# Patient Record
Sex: FEMALE | Race: WHITE | NOT HISPANIC OR LATINO | URBAN - METROPOLITAN AREA
[De-identification: names, ages, dates, MRNs, and addresses within clinical notes are randomized per-mention and may not be internally consistent; named-entity substitution may affect disease eponyms.]

---

## 2018-06-08 ENCOUNTER — EMERGENCY (EMERGENCY)
Facility: HOSPITAL | Age: 73
LOS: 1 days | Discharge: HOME | End: 2018-06-08
Attending: EMERGENCY MEDICINE | Admitting: EMERGENCY MEDICINE
Payer: MEDICARE

## 2018-06-08 VITALS
RESPIRATION RATE: 16 BRPM | DIASTOLIC BLOOD PRESSURE: 85 MMHG | SYSTOLIC BLOOD PRESSURE: 140 MMHG | TEMPERATURE: 98 F | HEART RATE: 85 BPM | OXYGEN SATURATION: 97 %

## 2018-06-08 VITALS
OXYGEN SATURATION: 96 % | HEIGHT: 64 IN | DIASTOLIC BLOOD PRESSURE: 79 MMHG | TEMPERATURE: 98 F | SYSTOLIC BLOOD PRESSURE: 137 MMHG | HEART RATE: 97 BPM | RESPIRATION RATE: 16 BRPM | WEIGHT: 115.08 LBS

## 2018-06-08 PROCEDURE — 73130 X-RAY EXAM OF HAND: CPT | Mod: 26,LT

## 2018-06-08 PROCEDURE — 73130 X-RAY EXAM OF HAND: CPT

## 2018-06-08 PROCEDURE — 99283 EMERGENCY DEPT VISIT LOW MDM: CPT | Mod: 25

## 2018-06-08 PROCEDURE — 29125 APPL SHORT ARM SPLINT STATIC: CPT

## 2018-06-08 PROCEDURE — 29125 APPL SHORT ARM SPLINT STATIC: CPT | Mod: LT

## 2018-06-08 RX ORDER — ACETAMINOPHEN 500 MG
650 TABLET ORAL ONCE
Qty: 0 | Refills: 0 | Status: COMPLETED | OUTPATIENT
Start: 2018-06-08 | End: 2018-06-08

## 2018-06-08 RX ORDER — CLONAZEPAM 1 MG
1 TABLET ORAL
Qty: 0 | Refills: 0 | COMMUNITY

## 2018-06-08 RX ORDER — AZTREONAM 2 G
1 VIAL (EA) INJECTION
Qty: 20 | Refills: 0 | OUTPATIENT
Start: 2018-06-08 | End: 2018-06-17

## 2018-06-08 RX ORDER — PANTOPRAZOLE SODIUM 20 MG/1
1 TABLET, DELAYED RELEASE ORAL
Qty: 0 | Refills: 0 | COMMUNITY

## 2018-06-08 RX ADMIN — Medication 650 MILLIGRAM(S): at 23:01

## 2018-06-08 RX ADMIN — Medication 1 TABLET(S): at 23:00

## 2018-06-08 NOTE — ED ADULT NURSE NOTE - OBJECTIVE STATEMENT
states gardening w/ gloves on yesterday and pulled forcefully at roots. today redness, swelling to lateral left hand. Pain on movement, palpation.

## 2018-06-08 NOTE — ED PROVIDER NOTE - UPPER EXTREMITY EXAM, LEFT
TENDERNESS/+ttp with erythema, mild swelling and warmth noted to lateral aspect of L hand along palmar and dorsal 3-5th MCPJ and proximal aspect of dorsal L 5th finger, fingers warm&mobile, cap refill<2sec, pulses and sensation intact, NVI, no axillary lymphadenopathy noted L, skin intact, no streaking/discharge/lesions noted, NVI/SWELLING +ttp with erythema, mild swelling and warmth noted to lateral aspect of L hand along palmar hypothenar eminence and dorsal 3-5th MCPJ and proximal aspect of dorsal L 5th finger, fingers warm&mobile, cap refill<2sec, pulses and sensation intact, NVI, no axillary lymphadenopathy noted L, skin intact, no streaking/discharge/lesions noted, NVI/SWELLING/TENDERNESS

## 2018-06-08 NOTE — ED PROVIDER NOTE - ATTENDING CONTRIBUTION TO CARE
Pt seen and examined and d/w PA.  agree with a and p.  pt is a 71 yo female gardening yesterday with gloves and snapping twigs, pt states with discomfort and redness over left side of hand, today increased, no f/c or numbness.  on exam hand with swelling and redness over hypothenar aspect of hand, mild ttp, no warmth. from at  wrist and fingers.  xray neg, labs wnl. ulnar splint, abx fu hand

## 2018-06-08 NOTE — ED ADULT TRIAGE NOTE - CHIEF COMPLAINT QUOTE
"I have swelling, redness and pain to my left hand since last night. I was working in the garden yesterday."

## 2018-06-08 NOTE — ED PROVIDER NOTE - PROGRESS NOTE DETAILS
Pt examined by ED attending, Dr. Mohr who agreed with disposition and plan. Spoke to radiologist regarding xray results, advised no acute fractures noted. +soft tissue swelling with calcifications noted. Will place L hand in ulnar gutter splint, bactrim, f/u hand. Spoke to hand specialist, Dr. Breen, agreed with disposition and plan, advised to f/u in office on Monday.

## 2018-06-08 NOTE — ED ADULT NURSE NOTE - CHPI ED SYMPTOMS NEG
no decreased eating/drinking/no tingling/no weakness/no dizziness/no nausea/no numbness/no vomiting/no chills/no fever

## 2018-06-08 NOTE — ED PROVIDER NOTE - MEDICAL DECISION MAKING DETAILS
71 YO F HERE WITH L HAND PAIN/SWELLING/REDNESS S/P GARDENING YESTERDAY, SKIN INTACT, NVI, R/O FX, CELLULITIS- WILL GET XRAY, ABX, RE-ASSESS

## 2018-06-08 NOTE — ED ADULT NURSE REASSESSMENT NOTE - NS ED NURSE REASSESS COMMENT FT1
States gardening yesterday w/ gloves on. Pulled forcefully at roots. today woke up w/ some swelling and tenderness to lateral aspect of left hand. Swelling/tenderness progressively worsened all day. (+) ROM, (+) sensation, (+) pulses. Skin intact. Open wound/insect bite(s) not visible.

## 2018-06-08 NOTE — ED PROVIDER NOTE - OBJECTIVE STATEMENT
71 y/o F with hx of anxiety presents with c/o L hand pain x 1 day. Pt states that she was gardening yesterday with gloves, noticed swelling and redness to palmar aspect of L hand yesterday. States that she noticed redness to dorsal aspect today with pain to lateral aspect. Denies open wounds, numbness, tingling, focal weakness, fever, chills, n/v or other symptoms. Pt is R hand dominant.

## 2018-08-21 ENCOUNTER — INPATIENT (INPATIENT)
Facility: HOSPITAL | Age: 73
LOS: 3 days | Discharge: HOME HEALTH | End: 2018-08-25
Attending: INTERNAL MEDICINE | Admitting: INTERNAL MEDICINE
Payer: MEDICARE

## 2018-08-21 VITALS
RESPIRATION RATE: 18 BRPM | HEART RATE: 79 BPM | SYSTOLIC BLOOD PRESSURE: 137 MMHG | HEIGHT: 64 IN | OXYGEN SATURATION: 100 % | DIASTOLIC BLOOD PRESSURE: 82 MMHG | WEIGHT: 115.08 LBS | TEMPERATURE: 98 F

## 2018-08-21 LAB
ALBUMIN SERPL ELPH-MCNC: 3.9 G/DL — SIGNIFICANT CHANGE UP (ref 3.3–5)
ALP SERPL-CCNC: 77 U/L — SIGNIFICANT CHANGE UP (ref 40–120)
ALT FLD-CCNC: 20 U/L — SIGNIFICANT CHANGE UP (ref 12–78)
ANION GAP SERPL CALC-SCNC: 5 MMOL/L — SIGNIFICANT CHANGE UP (ref 5–17)
APTT BLD: 30 SEC — SIGNIFICANT CHANGE UP (ref 27.5–37.4)
AST SERPL-CCNC: 15 U/L — SIGNIFICANT CHANGE UP (ref 15–37)
BASOPHILS # BLD AUTO: 0.05 K/UL — SIGNIFICANT CHANGE UP (ref 0–0.2)
BASOPHILS NFR BLD AUTO: 0.5 % — SIGNIFICANT CHANGE UP (ref 0–2)
BILIRUB SERPL-MCNC: 0.2 MG/DL — SIGNIFICANT CHANGE UP (ref 0.2–1.2)
BUN SERPL-MCNC: 18 MG/DL — SIGNIFICANT CHANGE UP (ref 7–23)
CALCIUM SERPL-MCNC: 9.1 MG/DL — SIGNIFICANT CHANGE UP (ref 8.5–10.1)
CHLORIDE SERPL-SCNC: 104 MMOL/L — SIGNIFICANT CHANGE UP (ref 96–108)
CO2 SERPL-SCNC: 30 MMOL/L — SIGNIFICANT CHANGE UP (ref 22–31)
CREAT SERPL-MCNC: 0.91 MG/DL — SIGNIFICANT CHANGE UP (ref 0.5–1.3)
EOSINOPHIL # BLD AUTO: 0.04 K/UL — SIGNIFICANT CHANGE UP (ref 0–0.5)
EOSINOPHIL NFR BLD AUTO: 0.4 % — SIGNIFICANT CHANGE UP (ref 0–6)
GLUCOSE SERPL-MCNC: 100 MG/DL — HIGH (ref 70–99)
HCT VFR BLD CALC: 38.1 % — SIGNIFICANT CHANGE UP (ref 34.5–45)
HGB BLD-MCNC: 12.7 G/DL — SIGNIFICANT CHANGE UP (ref 11.5–15.5)
IMM GRANULOCYTES NFR BLD AUTO: 0.7 % — SIGNIFICANT CHANGE UP (ref 0–1.5)
INR BLD: 0.95 RATIO — SIGNIFICANT CHANGE UP (ref 0.88–1.16)
LYMPHOCYTES # BLD AUTO: 1.82 K/UL — SIGNIFICANT CHANGE UP (ref 1–3.3)
LYMPHOCYTES # BLD AUTO: 18.6 % — SIGNIFICANT CHANGE UP (ref 13–44)
MCHC RBC-ENTMCNC: 30.6 PG — SIGNIFICANT CHANGE UP (ref 27–34)
MCHC RBC-ENTMCNC: 33.3 GM/DL — SIGNIFICANT CHANGE UP (ref 32–36)
MCV RBC AUTO: 91.8 FL — SIGNIFICANT CHANGE UP (ref 80–100)
MONOCYTES # BLD AUTO: 0.43 K/UL — SIGNIFICANT CHANGE UP (ref 0–0.9)
MONOCYTES NFR BLD AUTO: 4.4 % — SIGNIFICANT CHANGE UP (ref 2–14)
NEUTROPHILS # BLD AUTO: 7.36 K/UL — SIGNIFICANT CHANGE UP (ref 1.8–7.4)
NEUTROPHILS NFR BLD AUTO: 75.4 % — SIGNIFICANT CHANGE UP (ref 43–77)
NRBC # BLD: 0 /100 WBCS — SIGNIFICANT CHANGE UP (ref 0–0)
PLATELET # BLD AUTO: 149 K/UL — LOW (ref 150–400)
POTASSIUM SERPL-MCNC: 4.1 MMOL/L — SIGNIFICANT CHANGE UP (ref 3.5–5.3)
POTASSIUM SERPL-SCNC: 4.1 MMOL/L — SIGNIFICANT CHANGE UP (ref 3.5–5.3)
PROT SERPL-MCNC: 7.3 GM/DL — SIGNIFICANT CHANGE UP (ref 6–8.3)
PROTHROM AB SERPL-ACNC: 10.2 SEC — SIGNIFICANT CHANGE UP (ref 9.8–12.7)
RBC # BLD: 4.15 M/UL — SIGNIFICANT CHANGE UP (ref 3.8–5.2)
RBC # FLD: 12.5 % — SIGNIFICANT CHANGE UP (ref 10.3–14.5)
SODIUM SERPL-SCNC: 139 MMOL/L — SIGNIFICANT CHANGE UP (ref 135–145)
WBC # BLD: 9.77 K/UL — SIGNIFICANT CHANGE UP (ref 3.8–10.5)
WBC # FLD AUTO: 9.77 K/UL — SIGNIFICANT CHANGE UP (ref 3.8–10.5)

## 2018-08-21 PROCEDURE — 99285 EMERGENCY DEPT VISIT HI MDM: CPT

## 2018-08-21 PROCEDURE — 93010 ELECTROCARDIOGRAM REPORT: CPT

## 2018-08-21 PROCEDURE — 71045 X-RAY EXAM CHEST 1 VIEW: CPT | Mod: 26

## 2018-08-21 PROCEDURE — 70450 CT HEAD/BRAIN W/O DYE: CPT | Mod: 26

## 2018-08-21 PROCEDURE — 73502 X-RAY EXAM HIP UNI 2-3 VIEWS: CPT | Mod: 26,LT

## 2018-08-21 PROCEDURE — 73552 X-RAY EXAM OF FEMUR 2/>: CPT | Mod: 26,LT

## 2018-08-21 RX ORDER — ONDANSETRON 8 MG/1
4 TABLET, FILM COATED ORAL ONCE
Qty: 0 | Refills: 0 | Status: COMPLETED | OUTPATIENT
Start: 2018-08-21 | End: 2018-08-21

## 2018-08-21 RX ORDER — MORPHINE SULFATE 50 MG/1
4 CAPSULE, EXTENDED RELEASE ORAL ONCE
Qty: 0 | Refills: 0 | Status: DISCONTINUED | OUTPATIENT
Start: 2018-08-21 | End: 2018-08-21

## 2018-08-21 RX ORDER — SODIUM CHLORIDE 9 MG/ML
1000 INJECTION, SOLUTION INTRAVENOUS
Qty: 0 | Refills: 0 | Status: DISCONTINUED | OUTPATIENT
Start: 2018-08-21 | End: 2018-08-25

## 2018-08-21 RX ORDER — SODIUM CHLORIDE 9 MG/ML
3 INJECTION INTRAMUSCULAR; INTRAVENOUS; SUBCUTANEOUS EVERY 8 HOURS
Qty: 0 | Refills: 0 | Status: DISCONTINUED | OUTPATIENT
Start: 2018-08-21 | End: 2018-08-25

## 2018-08-21 RX ORDER — HYDROMORPHONE HYDROCHLORIDE 2 MG/ML
0.5 INJECTION INTRAMUSCULAR; INTRAVENOUS; SUBCUTANEOUS ONCE
Qty: 0 | Refills: 0 | Status: DISCONTINUED | OUTPATIENT
Start: 2018-08-21 | End: 2018-08-21

## 2018-08-21 RX ORDER — MORPHINE SULFATE 50 MG/1
2 CAPSULE, EXTENDED RELEASE ORAL EVERY 4 HOURS
Qty: 0 | Refills: 0 | Status: DISCONTINUED | OUTPATIENT
Start: 2018-08-21 | End: 2018-08-22

## 2018-08-21 RX ORDER — ACETAMINOPHEN 500 MG
1000 TABLET ORAL ONCE
Qty: 0 | Refills: 0 | Status: COMPLETED | OUTPATIENT
Start: 2018-08-21 | End: 2018-08-21

## 2018-08-21 RX ORDER — HEPARIN SODIUM 5000 [USP'U]/ML
5000 INJECTION INTRAVENOUS; SUBCUTANEOUS EVERY 8 HOURS
Qty: 0 | Refills: 0 | Status: COMPLETED | OUTPATIENT
Start: 2018-08-21 | End: 2018-08-21

## 2018-08-21 RX ADMIN — ONDANSETRON 4 MILLIGRAM(S): 8 TABLET, FILM COATED ORAL at 21:05

## 2018-08-21 RX ADMIN — Medication 400 MILLIGRAM(S): at 22:50

## 2018-08-21 RX ADMIN — HYDROMORPHONE HYDROCHLORIDE 0.5 MILLIGRAM(S): 2 INJECTION INTRAMUSCULAR; INTRAVENOUS; SUBCUTANEOUS at 23:43

## 2018-08-21 RX ADMIN — MORPHINE SULFATE 4 MILLIGRAM(S): 50 CAPSULE, EXTENDED RELEASE ORAL at 21:05

## 2018-08-21 RX ADMIN — SODIUM CHLORIDE 3 MILLILITER(S): 9 INJECTION INTRAMUSCULAR; INTRAVENOUS; SUBCUTANEOUS at 21:06

## 2018-08-21 NOTE — ED ADULT NURSE REASSESSMENT NOTE - NS ED NURSE REASSESS COMMENT FT1
Received pt from [facility].  VSS, pain meds given as ordered.  Will reassess pt.  Safety maintained

## 2018-08-21 NOTE — ED ADULT TRIAGE NOTE - NS ED NURSE DIRECT TO ROOM YN
Gynecologic Oncology Telephone note    Called Ms. Howard to discuss pathology, which revealed Stage IA grade 1 endometrioid adenocarcinoma.  All questions answered.  Discussed surveillance plan.      Penny Brooks MD.  [facility]     [address] [place] [address]  Phone: [phone]  Pager:  [phone]    
No

## 2018-08-21 NOTE — CONSULT NOTE ADULT - ASSESSMENT
A/P: 72F s/p mechanical fall today with L Hip IT Fx    Admit to medical service.  Pain control prn.  Bedrest.   FU labs  FU EKG  NPO after midnight  Heparin x 1 now, then hold AC  Plan for IMN L hip tomorrow 8/22 pending medical clearance.  Surgical Consent obtained after discussing risks, complications, benefits of the surgery.  Will DW Attending and advise if any changes to above stated plan.

## 2018-08-21 NOTE — ED ADULT TRIAGE NOTE - CHIEF COMPLAINT QUOTE
pt was getting up out of chair while leg was asleep, causing her to trip and fall.  c/o left hip pain. denies head injury or LOC.

## 2018-08-21 NOTE — CHART NOTE - NSCHARTNOTEFT_GEN_A_CORE
Ortho Preop Note      Diagnosis: L IT Fx  Procedure: IMN  Surgeon: Jenaro                          12.7   9.77  )-----------( 149      ( 21 Aug 2018 22:39 )             38.1     08-21    139  |  104  |  18  ----------------------------<  100<H>  4.1   |  30  |  0.91    Ca    9.1      21 Aug 2018 22:39    TPro  7.3  /  Alb  3.9  /  TBili  0.2  /  DBili  x   /  AST  15  /  ALT  20  /  AlkPhos  77  08-21    PT/INR - ( 21 Aug 2018 22:39 )   PT: 10.2 sec;   INR: 0.95 ratio         PTT - ( 21 Aug 2018 22:39 )  PTT:30.0 sec      [ ] Type & Screen: pending  [x ] CBC: 4am labs ordered  [x ] BMP: 4am labs ordered  [x ] PT/PTT/INR: 4am labs ordered  [ ] Urinalysis: pending  [x ] Chest X-ray  [x ] EKG  [x ] NPO/IVF  [x ] Consent: surgical and blood consent obtained and placed in chart in ED.   [ ] Clearance: pending  [ ] Added on to OR Schedule  [x ] Anti-coagulation held: Heparin x 1 in ER (nurse aware), then hold all AC       Ortho Pager [phone] Ortho Preop Note      Diagnosis: L IT Fx  Procedure: IMN  Surgeon: Jenaro                          12.7   9.77  )-----------( 149      ( 21 Aug 2018 22:39 )             38.1     08-21    139  |  104  |  18  ----------------------------<  100<H>  4.1   |  30  |  0.91    Ca    9.1      21 Aug 2018 22:39    TPro  7.3  /  Alb  3.9  /  TBili  0.2  /  DBili  x   /  AST  15  /  ALT  20  /  AlkPhos  77  08-21    PT/INR - ( 21 Aug 2018 22:39 )   PT: 10.2 sec;   INR: 0.95 ratio         PTT - ( 21 Aug 2018 22:39 )  PTT:30.0 sec      [ ] Type & Screen: pending  [x ] CBC: 4am labs ordered  [x ] BMP: 4am labs ordered  [x ] PT/PTT/INR: 4am labs ordered  [ ] Urinalysis: pending  [x ] Chest X-ray  [x ] EKG  [x ] NPO/IVF: after breakfast 8/22 am   [x ] Consent: surgical and blood consent obtained and placed in chart in ED.   [ ] Clearance: pending  [ ] Added on to OR Schedule  [x ] Anti-coagulation held: Heparin x 1 in ER (nurse aware), then hold all AC       Ortho Pager [phone] Ortho Preop Note      Diagnosis: L IT Fx  Procedure: IMN  Surgeon: Jenaro                          12.7   9.77  )-----------( 149      ( 21 Aug 2018 22:39 )             38.1     08-21    139  |  104  |  18  ----------------------------<  100<H>  4.1   |  30  |  0.91    Ca    9.1      21 Aug 2018 22:39    TPro  7.3  /  Alb  3.9  /  TBili  0.2  /  DBili  x   /  AST  15  /  ALT  20  /  AlkPhos  77  08-21    PT/INR - ( 21 Aug 2018 22:39 )   PT: 10.2 sec;   INR: 0.95 ratio         PTT - ( 21 Aug 2018 22:39 )  PTT:30.0 sec      [ ] Type & Screen: pending  [x ] CBC: 4am labs ordered  [x ] BMP: 4am labs ordered  [x ] PT/PTT/INR: 4am labs ordered  [ ] Urinalysis: pending  [x ] Chest X-ray  [x ] EKG  [x ] NPO/IVF  [x ] Consent: surgical and blood consent obtained and placed in chart in ED.   [ ] Clearance: pending  [ ] Added on to OR Schedule  [x ] Anti-coagulation held: Heparin x 1 in ER (nurse aware), then hold all AC       Ortho Pager [phone]

## 2018-08-21 NOTE — ED PROVIDER NOTE - NS_ ATTENDINGSCRIBEDETAILS _ED_A_ED_FT
The scribe's documentation has been prepared under my direction and personally reviewed by me in its entirety. I confirm that the note above accurately reflects all work, treatment, procedures, and medical decision-making performed by me.  Keith Hill MD

## 2018-08-21 NOTE — ED ADULT NURSE NOTE - NSIMPLEMENTINTERV_GEN_ALL_ED
Implemented All Fall Risk Interventions:  [place] to call system. Call bell, personal items and telephone within reach. Instruct patient to call for assistance. Room bathroom lighting operational. Non-slip footwear when patient is off stretcher. Physically safe environment: no spills, clutter or unnecessary equipment. Stretcher in lowest position, wheels locked, appropriate side rails in place. Provide visual cue, wrist band, yellow gown, etc. Monitor gait and stability. Monitor for mental status changes and reorient to person, place, and time. Review medications for side effects contributing to fall risk. Reinforce activity limits and safety measures with patient and family.

## 2018-08-21 NOTE — ED ADULT NURSE NOTE - OBJECTIVE STATEMENT
pt fell after her leg fell asleep as she attempted to get up from a chair. Pt has deformity of left hip. No anticoag, no LOC

## 2018-08-21 NOTE — ED PROVIDER NOTE - ATTENDING CONTRIBUTION TO CARE
I, Keith Hill MD, personally saw the patient with ACP.  I have personally performed a face to face diagnostic evaluation on this patient.  I have reviewed the ACP note and agree with the history, exam, and plan of care, except as noted.    71 yo F with PMHx of anxiety presents to ED reporting that she was sitting for a long period of time and her L leg "fell asleep" so when she went to stand up on it she fell and landed on her L side resulting in severe L hip pain and deformity as well as some head trauma.  She denies feeling dizzy, CP, SOB, LOC, n/v, numbness/weakness.    ***GEN - NAD; well appearing; A+O x3 ***HEAD - NC/AT   ***PULMONARY - CTA b/l, symmetric breath sounds. ***CARDIAC -s1s2, RRR, no M,G,R  ***ABDOMEN - ND, NT, soft, no guarding, no rebound, no masses    ***SKIN - no rash or bruising   ***NEUROLOGIC - alert and oriented, follows commands, sensation nl, motor nl, ***PSYCH - insight and judgment nl, memory nl, affect nl, thought nl  L hip TTP, limited ROM.  LLE neurovascular intact  pelvis stable

## 2018-08-21 NOTE — ED PROVIDER NOTE - OBJECTIVE STATEMENT
71 yo F with PMHx of anxiety presents to ED reporting that she was sitting for a long period of time and her L leg "fell asleep" so when she went to stand up on it she fell and landed on her L side resulting in severe L hip pain and deformity as well as some head trauma.  She denies feeling dizzy, CP, SOB. 71 yo F with PMHx of anxiety presents to ED reporting that she was sitting for a long period of time and her L leg "fell asleep" so when she went to stand up on it she fell and landed on her L side resulting in severe L hip pain and deformity as well as some head trauma.  She denies feeling dizzy, CP, SOB, LOC, n/v, numbness/weakness.

## 2018-08-21 NOTE — ED PROVIDER NOTE - CONSTITUTIONAL, MLM
normal... Well appearing, well nourished, awake, alert, oriented to person, place, time/situation, appears uncomfortable and in pain

## 2018-08-21 NOTE — CONSULT NOTE ADULT - SUBJECTIVE AND OBJECTIVE BOX
HPI: 72F s/p mechanical fall earlier today presents to the ER with L hip pain. States she fell asleep in her chair and when she woke up her leg was asleep. This caused her to trip and fall onto her left side. She also bumped her head slightly when falling. Was unable to ambulate after fall so was transported via EMS. Denies any other injuries at the time of the fall. No presyncopal symptoms or LOC. C/O L lateral hip pain without radiation. No knee or ankle pain. Denies any paresthesias. She has no other complaints at this time.  at bedside.    PAST MEDICAL & SURGICAL HISTORY:  Anxiety  GERD  S/P L Knee arthroscopy (2016)  S/P cholecystectomy (2016)    Home Medications:  KlonoPIN 0.5 mg oral tablet: 1 tab(s) orally once a day midday (08 Jun 2018 21:00)  KlonoPIN 1 mg oral tablet: 1 tab(s) orally 2 times a day (08 Jun 2018 21:00)  pantoprazole 40 mg oral delayed release tablet: 1 tab(s) orally once a day (08 Jun 2018 21:00)    Allergies    aspirin (Swelling)  Bactrim (Unknown)  erythromycin (Hives)  NSAIDs (Swelling)  penicillins (Hives)  Zithromax (Hives)    Intolerances      PE:  Vital Signs Last 24 Hrs  T(C): 36.7 (21 Aug 2018 20:08), Max: 36.7 (21 Aug 2018 20:08)  T(F): 98.1 (21 Aug 2018 20:08), Max: 98.1 (21 Aug 2018 20:08)  HR: 79 (21 Aug 2018 20:08) (79 - 79)  BP: 137/82 (21 Aug 2018 20:08) (137/82 - 137/82)  BP(mean): --  RR: 18 (21 Aug 2018 20:08) (18 - 18)  SpO2: 100% (21 Aug 2018 20:08) (100% - 100%)  General: Alert, comfortable. Mild distress.  LLE: Shortened, ER.  Skin C/D/I about the hip/groin/thigh.  TTP about the lateral aspect of the hip.  No TTP about the thigh/knee/shin/ankle/foot.  Compartments soft, non tender, compressible.  EHL/FHL/AT/Gastroc intact.  SILT.  2+ DP/PT pulses  Brisk capillary refill.   Foot well perfused.    Secondary Exam:  No TTP about the entire C/T/L Spine. Able to flex and extend C spine without pain.  Full painless AROM of the upper extremities. SILT. 5/5 Motor.  No RLE TTP.   Able to SLR RLE.  Full painless AROM of the R knee/ankle. SILT about the RLE. 2+ DP/PT pulses.    Xrays:  AP Pelvis/L Hip/L Femur: L intertrochanteric Hip Fracture. No other fracture identified. HPI: 72F s/p mechanical fall earlier today presents to the ER with L hip pain. States she fell asleep in her chair and when she woke up her leg was asleep. This caused her to trip and fall onto her left side. She also bumped her head slightly when falling. Was unable to ambulate after fall so was transported via EMS. Denies any other injuries at the time of the fall. No presyncopal symptoms or LOC. C/O L lateral hip pain without radiation. No knee or ankle pain. Denies any paresthesias. She has no other complaints at this time.  at bedside.     ros: all other ros neg    PAST MEDICAL & SURGICAL HISTORY:  Anxiety  GERD  S/P L Knee arthroscopy (2016)  S/P cholecystectomy (2016)    Home Medications:  KlonoPIN 0.5 mg oral tablet: 1 tab(s) orally once a day midday (08 Jun 2018 21:00)  KlonoPIN 1 mg oral tablet: 1 tab(s) orally 2 times a day (08 Jun 2018 21:00)  pantoprazole 40 mg oral delayed release tablet: 1 tab(s) orally once a day (08 Jun 2018 21:00)    Allergies    aspirin (Swelling)  Bactrim (Unknown)  erythromycin (Hives)  NSAIDs (Swelling)  penicillins (Hives)  Zithromax (Hives)    Intolerances      PE:  Vital Signs Last 24 Hrs  T(C): 36.7 (21 Aug 2018 20:08), Max: 36.7 (21 Aug 2018 20:08)  T(F): 98.1 (21 Aug 2018 20:08), Max: 98.1 (21 Aug 2018 20:08)  HR: 79 (21 Aug 2018 20:08) (79 - 79)  BP: 137/82 (21 Aug 2018 20:08) (137/82 - 137/82)  BP(mean): --  RR: 18 (21 Aug 2018 20:08) (18 - 18)  SpO2: 100% (21 Aug 2018 20:08) (100% - 100%)  General: Alert, comfortable. Mild distress.  LLE: Shortened, ER.  Skin C/D/I about the hip/groin/thigh.  TTP about the lateral aspect of the hip.  No TTP about the thigh/knee/shin/ankle/foot.  Compartments soft, non tender, compressible.  EHL/FHL/AT/Gastroc intact.  SILT.  2+ DP/PT pulses  Brisk capillary refill.   Foot well perfused.    Secondary Exam:  No TTP about the entire C/T/L Spine. Able to flex and extend C spine without pain.  Full painless AROM of the upper extremities. SILT. 5/5 Motor.  No RLE TTP.   Able to SLR RLE.  Full painless AROM of the R knee/ankle. SILT about the RLE. 2+ DP/PT pulses.    Xrays:  AP Pelvis/L Hip/L Femur: L intertrochanteric Hip Fracture. No other fracture identified.

## 2018-08-21 NOTE — ED PROVIDER NOTE - PROGRESS NOTE DETAILS
+ L hip fx.  Case d.w orthopedic team who will be down to evaluate the patient -Raphael Randall PA-C Orthopedic team requests medical admission, patient to have surgery to repair hip tomorrow.  Patient aware and agreeable -Raphael Randall PA-C

## 2018-08-22 LAB
ABO RH CONFIRMATION: SIGNIFICANT CHANGE UP
ANION GAP SERPL CALC-SCNC: 4 MMOL/L — LOW (ref 5–17)
ANION GAP SERPL CALC-SCNC: 6 MMOL/L — SIGNIFICANT CHANGE UP (ref 5–17)
APTT BLD: 29.7 SEC — SIGNIFICANT CHANGE UP (ref 27.5–37.4)
BLD GP AB SCN SERPL QL: SIGNIFICANT CHANGE UP
BUN SERPL-MCNC: 13 MG/DL — SIGNIFICANT CHANGE UP (ref 7–23)
BUN SERPL-MCNC: 18 MG/DL — SIGNIFICANT CHANGE UP (ref 7–23)
CALCIUM SERPL-MCNC: 8.1 MG/DL — LOW (ref 8.5–10.1)
CALCIUM SERPL-MCNC: 8.6 MG/DL — SIGNIFICANT CHANGE UP (ref 8.5–10.1)
CHLORIDE SERPL-SCNC: 105 MMOL/L — SIGNIFICANT CHANGE UP (ref 96–108)
CHLORIDE SERPL-SCNC: 106 MMOL/L — SIGNIFICANT CHANGE UP (ref 96–108)
CO2 SERPL-SCNC: 28 MMOL/L — SIGNIFICANT CHANGE UP (ref 22–31)
CO2 SERPL-SCNC: 30 MMOL/L — SIGNIFICANT CHANGE UP (ref 22–31)
CREAT SERPL-MCNC: 0.82 MG/DL — SIGNIFICANT CHANGE UP (ref 0.5–1.3)
CREAT SERPL-MCNC: 0.92 MG/DL — SIGNIFICANT CHANGE UP (ref 0.5–1.3)
GLUCOSE BLDC GLUCOMTR-MCNC: 156 MG/DL — HIGH (ref 70–99)
GLUCOSE SERPL-MCNC: 110 MG/DL — HIGH (ref 70–99)
GLUCOSE SERPL-MCNC: 111 MG/DL — HIGH (ref 70–99)
HCT VFR BLD CALC: 30.4 % — LOW (ref 34.5–45)
HCT VFR BLD CALC: 35.6 % — SIGNIFICANT CHANGE UP (ref 34.5–45)
HGB BLD-MCNC: 10 G/DL — LOW (ref 11.5–15.5)
HGB BLD-MCNC: 11.7 G/DL — SIGNIFICANT CHANGE UP (ref 11.5–15.5)
INR BLD: 1.01 RATIO — SIGNIFICANT CHANGE UP (ref 0.88–1.16)
MCHC RBC-ENTMCNC: 30.5 PG — SIGNIFICANT CHANGE UP (ref 27–34)
MCHC RBC-ENTMCNC: 30.6 PG — SIGNIFICANT CHANGE UP (ref 27–34)
MCHC RBC-ENTMCNC: 32.9 GM/DL — SIGNIFICANT CHANGE UP (ref 32–36)
MCHC RBC-ENTMCNC: 32.9 GM/DL — SIGNIFICANT CHANGE UP (ref 32–36)
MCV RBC AUTO: 93 FL — SIGNIFICANT CHANGE UP (ref 80–100)
MCV RBC AUTO: 93 FL — SIGNIFICANT CHANGE UP (ref 80–100)
NRBC # BLD: 0 /100 WBCS — SIGNIFICANT CHANGE UP (ref 0–0)
NRBC # BLD: 0 /100 WBCS — SIGNIFICANT CHANGE UP (ref 0–0)
PLATELET # BLD AUTO: 103 K/UL — LOW (ref 150–400)
PLATELET # BLD AUTO: 153 K/UL — SIGNIFICANT CHANGE UP (ref 150–400)
POTASSIUM SERPL-MCNC: 4.1 MMOL/L — SIGNIFICANT CHANGE UP (ref 3.5–5.3)
POTASSIUM SERPL-MCNC: 4.4 MMOL/L — SIGNIFICANT CHANGE UP (ref 3.5–5.3)
POTASSIUM SERPL-SCNC: 4.1 MMOL/L — SIGNIFICANT CHANGE UP (ref 3.5–5.3)
POTASSIUM SERPL-SCNC: 4.4 MMOL/L — SIGNIFICANT CHANGE UP (ref 3.5–5.3)
PROTHROM AB SERPL-ACNC: 10.9 SEC — SIGNIFICANT CHANGE UP (ref 9.8–12.7)
RBC # BLD: 3.27 M/UL — LOW (ref 3.8–5.2)
RBC # BLD: 3.83 M/UL — SIGNIFICANT CHANGE UP (ref 3.8–5.2)
RBC # FLD: 12.5 % — SIGNIFICANT CHANGE UP (ref 10.3–14.5)
RBC # FLD: 12.6 % — SIGNIFICANT CHANGE UP (ref 10.3–14.5)
SODIUM SERPL-SCNC: 139 MMOL/L — SIGNIFICANT CHANGE UP (ref 135–145)
SODIUM SERPL-SCNC: 140 MMOL/L — SIGNIFICANT CHANGE UP (ref 135–145)
TYPE + AB SCN PNL BLD: SIGNIFICANT CHANGE UP
WBC # BLD: 8.4 K/UL — SIGNIFICANT CHANGE UP (ref 3.8–10.5)
WBC # BLD: 8.91 K/UL — SIGNIFICANT CHANGE UP (ref 3.8–10.5)
WBC # FLD AUTO: 8.4 K/UL — SIGNIFICANT CHANGE UP (ref 3.8–10.5)
WBC # FLD AUTO: 8.91 K/UL — SIGNIFICANT CHANGE UP (ref 3.8–10.5)

## 2018-08-22 PROCEDURE — 72170 X-RAY EXAM OF PELVIS: CPT | Mod: 26

## 2018-08-22 RX ORDER — SODIUM CHLORIDE 9 MG/ML
1000 INJECTION, SOLUTION INTRAVENOUS
Qty: 0 | Refills: 0 | Status: DISCONTINUED | OUTPATIENT
Start: 2018-08-22 | End: 2018-08-25

## 2018-08-22 RX ORDER — PANTOPRAZOLE SODIUM 20 MG/1
40 TABLET, DELAYED RELEASE ORAL
Qty: 0 | Refills: 0 | Status: DISCONTINUED | OUTPATIENT
Start: 2018-08-22 | End: 2018-08-25

## 2018-08-22 RX ORDER — ONDANSETRON 8 MG/1
4 TABLET, FILM COATED ORAL EVERY 6 HOURS
Qty: 0 | Refills: 0 | Status: DISCONTINUED | OUTPATIENT
Start: 2018-08-22 | End: 2018-08-25

## 2018-08-22 RX ORDER — OXYCODONE HYDROCHLORIDE 5 MG/1
10 TABLET ORAL EVERY 4 HOURS
Qty: 0 | Refills: 0 | Status: DISCONTINUED | OUTPATIENT
Start: 2018-08-22 | End: 2018-08-25

## 2018-08-22 RX ORDER — CLONAZEPAM 1 MG
1 TABLET ORAL
Qty: 0 | Refills: 0 | Status: DISCONTINUED | OUTPATIENT
Start: 2018-08-22 | End: 2018-08-25

## 2018-08-22 RX ORDER — BENZOCAINE AND MENTHOL 5; 1 G/100ML; G/100ML
1 LIQUID ORAL
Qty: 0 | Refills: 0 | Status: DISCONTINUED | OUTPATIENT
Start: 2018-08-22 | End: 2018-08-25

## 2018-08-22 RX ORDER — DOCUSATE SODIUM 100 MG
100 CAPSULE ORAL THREE TIMES A DAY
Qty: 0 | Refills: 0 | Status: DISCONTINUED | OUTPATIENT
Start: 2018-08-22 | End: 2018-08-25

## 2018-08-22 RX ORDER — ACETAMINOPHEN 500 MG
650 TABLET ORAL EVERY 6 HOURS
Qty: 0 | Refills: 0 | Status: DISCONTINUED | OUTPATIENT
Start: 2018-08-22 | End: 2018-08-25

## 2018-08-22 RX ORDER — LORATADINE 10 MG/1
10 TABLET ORAL DAILY
Qty: 0 | Refills: 0 | Status: DISCONTINUED | OUTPATIENT
Start: 2018-08-22 | End: 2018-08-25

## 2018-08-22 RX ORDER — MORPHINE SULFATE 50 MG/1
2 CAPSULE, EXTENDED RELEASE ORAL EVERY 4 HOURS
Qty: 0 | Refills: 0 | Status: DISCONTINUED | OUTPATIENT
Start: 2018-08-22 | End: 2018-08-25

## 2018-08-22 RX ORDER — FENTANYL CITRATE 50 UG/ML
50 INJECTION INTRAVENOUS
Qty: 0 | Refills: 0 | Status: DISCONTINUED | OUTPATIENT
Start: 2018-08-22 | End: 2018-08-22

## 2018-08-22 RX ORDER — ACETAMINOPHEN 500 MG
650 TABLET ORAL EVERY 6 HOURS
Qty: 0 | Refills: 0 | Status: DISCONTINUED | OUTPATIENT
Start: 2018-08-22 | End: 2018-08-22

## 2018-08-22 RX ORDER — ENOXAPARIN SODIUM 100 MG/ML
40 INJECTION SUBCUTANEOUS EVERY 24 HOURS
Qty: 0 | Refills: 0 | Status: DISCONTINUED | OUTPATIENT
Start: 2018-08-23 | End: 2018-08-25

## 2018-08-22 RX ORDER — CLONAZEPAM 1 MG
0.5 TABLET ORAL
Qty: 0 | Refills: 0 | Status: DISCONTINUED | OUTPATIENT
Start: 2018-08-22 | End: 2018-08-25

## 2018-08-22 RX ORDER — OXYCODONE HYDROCHLORIDE 5 MG/1
5 TABLET ORAL ONCE
Qty: 0 | Refills: 0 | Status: DISCONTINUED | OUTPATIENT
Start: 2018-08-22 | End: 2018-08-22

## 2018-08-22 RX ORDER — OXYCODONE HYDROCHLORIDE 5 MG/1
5 TABLET ORAL EVERY 4 HOURS
Qty: 0 | Refills: 0 | Status: DISCONTINUED | OUTPATIENT
Start: 2018-08-22 | End: 2018-08-25

## 2018-08-22 RX ORDER — DIPHENHYDRAMINE HCL 50 MG
25 CAPSULE ORAL AT BEDTIME
Qty: 0 | Refills: 0 | Status: DISCONTINUED | OUTPATIENT
Start: 2018-08-22 | End: 2018-08-25

## 2018-08-22 RX ORDER — CEFAZOLIN SODIUM 1 G
2000 VIAL (EA) INJECTION EVERY 8 HOURS
Qty: 0 | Refills: 0 | Status: COMPLETED | OUTPATIENT
Start: 2018-08-22 | End: 2018-08-23

## 2018-08-22 RX ORDER — DIPHENHYDRAMINE HCL 50 MG
25 CAPSULE ORAL EVERY 6 HOURS
Qty: 0 | Refills: 0 | Status: DISCONTINUED | OUTPATIENT
Start: 2018-08-22 | End: 2018-08-25

## 2018-08-22 RX ORDER — HYDROMORPHONE HYDROCHLORIDE 2 MG/ML
0.5 INJECTION INTRAMUSCULAR; INTRAVENOUS; SUBCUTANEOUS
Qty: 0 | Refills: 0 | Status: DISCONTINUED | OUTPATIENT
Start: 2018-08-22 | End: 2018-08-25

## 2018-08-22 RX ORDER — SODIUM CHLORIDE 9 MG/ML
1000 INJECTION, SOLUTION INTRAVENOUS
Qty: 0 | Refills: 0 | Status: DISCONTINUED | OUTPATIENT
Start: 2018-08-22 | End: 2018-08-22

## 2018-08-22 RX ORDER — ONDANSETRON 8 MG/1
4 TABLET, FILM COATED ORAL ONCE
Qty: 0 | Refills: 0 | Status: DISCONTINUED | OUTPATIENT
Start: 2018-08-22 | End: 2018-08-22

## 2018-08-22 RX ORDER — MEPERIDINE HYDROCHLORIDE 50 MG/ML
12.5 INJECTION INTRAMUSCULAR; INTRAVENOUS; SUBCUTANEOUS
Qty: 0 | Refills: 0 | Status: DISCONTINUED | OUTPATIENT
Start: 2018-08-22 | End: 2018-08-22

## 2018-08-22 RX ADMIN — MORPHINE SULFATE 2 MILLIGRAM(S): 50 CAPSULE, EXTENDED RELEASE ORAL at 03:50

## 2018-08-22 RX ADMIN — FENTANYL CITRATE 50 MICROGRAM(S): 50 INJECTION INTRAVENOUS at 21:20

## 2018-08-22 RX ADMIN — SODIUM CHLORIDE 3 MILLILITER(S): 9 INJECTION INTRAMUSCULAR; INTRAVENOUS; SUBCUTANEOUS at 05:34

## 2018-08-22 RX ADMIN — Medication 0.5 MILLIGRAM(S): at 13:43

## 2018-08-22 RX ADMIN — HYDROMORPHONE HYDROCHLORIDE 0.5 MILLIGRAM(S): 2 INJECTION INTRAMUSCULAR; INTRAVENOUS; SUBCUTANEOUS at 16:50

## 2018-08-22 RX ADMIN — SODIUM CHLORIDE 3 MILLILITER(S): 9 INJECTION INTRAMUSCULAR; INTRAVENOUS; SUBCUTANEOUS at 22:30

## 2018-08-22 RX ADMIN — HYDROMORPHONE HYDROCHLORIDE 0.5 MILLIGRAM(S): 2 INJECTION INTRAMUSCULAR; INTRAVENOUS; SUBCUTANEOUS at 21:36

## 2018-08-22 RX ADMIN — SODIUM CHLORIDE 3 MILLILITER(S): 9 INJECTION INTRAMUSCULAR; INTRAVENOUS; SUBCUTANEOUS at 13:43

## 2018-08-22 RX ADMIN — Medication 1 MILLIGRAM(S): at 05:44

## 2018-08-22 RX ADMIN — HYDROMORPHONE HYDROCHLORIDE 0.5 MILLIGRAM(S): 2 INJECTION INTRAMUSCULAR; INTRAVENOUS; SUBCUTANEOUS at 09:04

## 2018-08-22 RX ADMIN — SODIUM CHLORIDE 75 MILLILITER(S): 9 INJECTION, SOLUTION INTRAVENOUS at 03:30

## 2018-08-22 RX ADMIN — PANTOPRAZOLE SODIUM 40 MILLIGRAM(S): 20 TABLET, DELAYED RELEASE ORAL at 05:43

## 2018-08-22 RX ADMIN — MORPHINE SULFATE 2 MILLIGRAM(S): 50 CAPSULE, EXTENDED RELEASE ORAL at 03:29

## 2018-08-22 NOTE — PROGRESS NOTE ADULT - SUBJECTIVE AND OBJECTIVE BOX
Pt s/e at bedside resting comfortably after stable transfer from PACU. Pain well tolerated. Denies CP/dyspnea/Calf pain bilaterally    Vital Signs Last 24 Hrs  T(C): 36.8 (22 Aug 2018 22:29), Max: 37.3 (22 Aug 2018 17:28)  T(F): 98.3 (22 Aug 2018 22:29), Max: 99.1 (22 Aug 2018 17:28)  HR: 97 (22 Aug 2018 22:29) (77 - 104)  BP: 114/73 (22 Aug 2018 22:29) (97/52 - 154/65)  BP(mean): --  RR: 17 (22 Aug 2018 22:29) (12 - 18)  SpO2: 100% (22 Aug 2018 22:29) (95% - 100%)vitals    PE LLE  aquacell dressing overlying L hip c/d/i  NTTP b/l calf  compartments soft  +ehl/fhl/gs/ta  SILT L2-S1  +DP/PT Post-Op Check:    Pt s/e at bedside resting comfortably after stable transfer from PACU. Pain well tolerated. Denies CP/dyspnea/Calf pain bilaterally    Vital Signs Last 24 Hrs  T(C): 36.8 (22 Aug 2018 22:29), Max: 37.3 (22 Aug 2018 17:28)  T(F): 98.3 (22 Aug 2018 22:29), Max: 99.1 (22 Aug 2018 17:28)  HR: 97 (22 Aug 2018 22:29) (77 - 104)  BP: 114/73 (22 Aug 2018 22:29) (97/52 - 154/65)  BP(mean): --  RR: 17 (22 Aug 2018 22:29) (12 - 18)  SpO2: 100% (22 Aug 2018 22:29) (95% - 100%)vitals    PE LLE  aquacell dressing overlying L hip c/d/i  NTTP b/l calf  compartments soft  +ehl/fhl/gs/ta  SILT L2-S1  +DP/PT

## 2018-08-22 NOTE — DISCHARGE NOTE ADULT - PLAN OF CARE
Return to baseline ADLs 1.	Pain Control  2.	Walking with full weight bearing as tolerated, with assistive devices (walker/Cane as Needed)  3.	DVT Prophylaxis for 30 days  4.	PT as needed  5.	Follow up with Dr. Eason as Outpatient in 10-14 Days after Discharge from the Hospital or Rehab. Call Office For Appointment.  6.	Remove Staples Post-Op Day 14, and Remove Dressing Post-Op Day 10, with Daily Dressing Changes as Need.  7.	Ice affected area as Needed  8.	Keep Dressing  Clean and dry. IM Nail DC Instructions:    1.	Resume previous diet, regular or diabetic as appropriate  2.	Weight Bearing as Tolerated with assistance and rolling walker  3.	Continue DVT/PE Prophylaxis. Lovenox SC. See Med Rec for Duration and dose.  4.	PT daily  5.	Follow up with Orthopedic Surgeon LA GALE in 14-21 Days. Call Office For Appointment.  6.	Staples to be removed Post-Op Day 14 (9/5)  7.	Ice the hip as much as possible  8.	Keep Aquacel bandage on Hip. Change only if wet or soiled using Tegaderm/Paper tape and dry gauze. No bandage needed after staple removal.  9.     OK to shower with Aquacel beige bandage, otherwise sponge bathe only. Will need assistance to shower.

## 2018-08-22 NOTE — H&P ADULT - ASSESSMENT
72 y.o. female with PMH anxiety, GERD presents with s/p fall. Pt reports sitting in porch reading when she fell asleep. When she woke up, her left leg was numb and was "asleep". She got up and fell on the left side. Pt reports head trauma. Denies LOC. Reports pain to left hip. She denies any lightheadedness or dizziness. Denies feeling weak. Pt denies any fever, chills, CP, SOB, abd pain, dysuria or diarrhea. Pt baseline ambulatory, able to do all ADLs, able to climb 2 flights of stairs without any issues.    #s/p fall, mechanical by history  #left hip intertrochant. fracture  -admit to med surg  -iv hydration  -prn pain control  -NPO except meds  -ortho to perform IMN 8/22  -EKG: NSR HR 94, normal axis, no ST T changes  -risk stratification for OR: pt with good functional capacity. Based on RCRI criteria, pt class I risk with 0.4% risk of major cardiac event. Pt to undergo intermediate risk procedure  -incentive spirometry post op  -dvt ppx    #anxiety  -cont klonipin 1mg AM, 0.5mg mid-day, 1mg PM    #GERD  -on PPI    #DVT ppx  -SCDs    IMPROVE VTE Individual Risk Assessment    RISK                                                                Points    [  ] Previous VTE                                                  3    [  ] Thrombophilia                                               2    [  ] Lower limb paralysis                                      2        (unable to hold up >15 seconds)      [  ] Current Cancer                                              2         (within 6 months)    [ x ] Immobilization > 24 hrs                                1    [  ] ICU/CCU stay > 24 hours                              1    [ x ] Age > 60                                                      1    IMPROVE VTE Score ____2____ 72 y.o. female with PMH anxiety, GERD presents with s/p fall. Pt reports sitting in porch reading when she fell asleep. When she woke up, her left leg was numb and was "asleep". She got up and fell on the left side. Pt reports head trauma. Denies LOC. Reports pain to left hip. She denies any lightheadedness or dizziness. Denies feeling weak. Pt denies any fever, chills, CP, SOB, abd pain, dysuria or diarrhea. Pt baseline ambulatory, able to do all ADLs, able to climb 2 flights of stairs without any issues.    #s/p fall, mechanical by history  #left hip intertrochant. fracture  -admit to med surg  -iv hydration  -prn pain control  -NPO except meds  -ortho to perform IMN 8/22  -EKG: NSR HR 94, normal axis, no ST T changes  -risk stratification for OR: pt with good functional capacity. Based on RCRI criteria, pt class I risk with 0.4% risk of major cardiac event. Pt to undergo intermediate risk procedure  -incentive spirometry post op  -dvt ppx    #anxiety  -cont klonipin 1mg AM, 0.5mg mid-day, 1mg PM    #tobacco use, 1PPD  -encouraged smoking cessation  -pt currently refused nicotine patch    #GERD  -on PPI    #DVT ppx  -SCDs    IMPROVE VTE Individual Risk Assessment    RISK                                                                Points    [  ] Previous VTE                                                  3    [  ] Thrombophilia                                               2    [  ] Lower limb paralysis                                      2        (unable to hold up >15 seconds)      [  ] Current Cancer                                              2         (within 6 months)    [ x ] Immobilization > 24 hrs                                1    [  ] ICU/CCU stay > 24 hours                              1    [ x ] Age > 60                                                      1    IMPROVE VTE Score ____2____

## 2018-08-22 NOTE — DISCHARGE NOTE ADULT - NSTOBACCOHOTLINE_GEN_A_CS
[facility] Smokers Quitline ([phone]) [facility] Smokers Quitline ([phone])

## 2018-08-22 NOTE — PROGRESS NOTE ADULT - ASSESSMENT
73yo F s/p L IMN, now POD 0  FU AM labs  Continue Perioperative ABx  Pain control  DVT PPx starting POD 1  SCDs bilaterally  WBAT LLE  PT/OT  OOB and ICS encouraged  D/C Planning

## 2018-08-22 NOTE — DISCHARGE NOTE ADULT - MEDICATION SUMMARY - MEDICATIONS TO TAKE
I will START or STAY ON the medications listed below when I get home from the hospital:    acetaminophen 325 mg oral tablet  -- 2 tab(s) by mouth every 6 hours, As needed, headache  -- Indication: For mild pain    traMADol 50 mg oral tablet  -- 1 tab(s) by mouth every 6 hours MDD:200mg  -- Caution federal law prohibits the transfer of this drug to any person other  than the person for whom it was prescribed.  May cause drowsiness.  Alcohol may intensify this effect.  Use care when operating dangerous machinery.  Obtain medical advice before taking any non-prescription drugs as some may affect the action of this medication.    -- Indication: For moderate pain    enoxaparin 40 mg/0.4 mL injectable solution  -- 40 milligram(s) subcutaneously once a day continue for 4 weeks  -- It is very important that you take or use this exactly as directed.  Do not skip doses or discontinue unless directed by your doctor.    -- Indication: For DVT prophylaxis    KlonoPIN 1 mg oral tablet  -- 1 tab(s) by mouth 2 times a day  -- Indication: For Anxiety    KlonoPIN 0.5 mg oral tablet  -- 1 tab(s) by mouth once a day midday  -- Indication: For Anxiety    docusate sodium 100 mg oral capsule  -- 1 cap(s) by mouth 2 times a day   -- Indication: For Constipation    pantoprazole 40 mg oral delayed release tablet  -- 1 tab(s) by mouth once a day  -- Indication: For GERD    Multiple Vitamins oral tablet  -- 1 tab(s) by mouth once a day  -- Indication: For supplement

## 2018-08-22 NOTE — BRIEF OPERATIVE NOTE - PROCEDURE
<<-----Click on this checkbox to enter Procedure Intramedullary nail fixation of left femur  08/22/2018    Active  MPARTAN

## 2018-08-22 NOTE — DISCHARGE NOTE ADULT - CARE PLAN
Principal Discharge DX:	Closed fracture of left hip, initial encounter  Goal:	Return to baseline ADLs  Assessment and plan of treatment:	1.	Pain Control  2.	Walking with full weight bearing as tolerated, with assistive devices (walker/Cane as Needed)  3.	DVT Prophylaxis for 30 days  4.	PT as needed  5.	Follow up with Dr. Eason as Outpatient in 10-14 Days after Discharge from the Hospital or Rehab. Call Office For Appointment.  6.	Remove Staples Post-Op Day 14, and Remove Dressing Post-Op Day 10, with Daily Dressing Changes as Need.  7.	Ice affected area as Needed  8.	Keep Dressing  Clean and dry. Principal Discharge DX:	Closed fracture of left hip, initial encounter  Goal:	Return to baseline ADLs  Assessment and plan of treatment:	IM Nail DC Instructions:    1.	Resume previous diet, regular or diabetic as appropriate  2.	Weight Bearing as Tolerated with assistance and rolling walker  3.	Continue DVT/PE Prophylaxis. Lovenox SC. See Med Rec for Duration and dose.  4.	PT daily  5.	Follow up with Orthopedic Surgeon LA GALE in 14-21 Days. Call Office For Appointment.  6.	Staples to be removed Post-Op Day 14 (9/5)  7.	Ice the hip as much as possible  8.	Keep Aquacel bandage on Hip. Change only if wet or soiled using Tegaderm/Paper tape and dry gauze. No bandage needed after staple removal.  9.     OK to shower with Aquacel beige bandage, otherwise sponge bathe only. Will need assistance to shower.

## 2018-08-22 NOTE — PROGRESS NOTE ADULT - SUBJECTIVE AND OBJECTIVE BOX
Pt S/E at bedside, no acute events overnight, pain controlled    Vital Signs Last 24 Hrs  T(C): 36.6 (22 Aug 2018 03:21), Max: 36.7 (21 Aug 2018 20:08)  T(F): 97.8 (22 Aug 2018 03:21), Max: 98.1 (21 Aug 2018 20:08)  HR: 82 (22 Aug 2018 03:21) (77 - 82)  BP: 123/61 (22 Aug 2018 03:21) (123/61 - 137/82)  BP(mean): --  RR: 18 (22 Aug 2018 03:21) (17 - 18)  SpO2: 97% (22 Aug 2018 03:21) (97% - 100%)    Gen: NAD    Left Lower Extremity:  Skin intact  +EHL/FHL/TA/GS  SILT L3-S1  +DP/PT Pulses  Compartments soft  No calf TTP B/L

## 2018-08-22 NOTE — DISCHARGE NOTE ADULT - DURABLE MEDICAL EQUIPMENT AGENCY
hospital bed, Rolling walker,[facility] [phone] hospital bed, Rolling walker,commode

## 2018-08-22 NOTE — H&P ADULT - NSHPPHYSICALEXAM_GEN_ALL_CORE
Vital Signs Last 24 Hrs  T(C): 36.7 (21 Aug 2018 20:08), Max: 36.7 (21 Aug 2018 20:08)  T(F): 98.1 (21 Aug 2018 20:08), Max: 98.1 (21 Aug 2018 20:08)  HR: 77 (22 Aug 2018 00:17) (77 - 79)  BP: 125/80 (22 Aug 2018 00:17) (125/80 - 137/82)  BP(mean): --  RR: 17 (22 Aug 2018 00:17) (17 - 18)  SpO2: 100% (22 Aug 2018 00:17) (100% - 100%)    GEN: appears comfortable  Neuro: AAOx3, nonfocal  HEENT: NC/AT, EOMI  Neck: no thyroidmegaly, no JVD  Cardiovascular: S1S2 present, regular rhythm, no murmur  Respiratory: breath sounds normal bilaterally, no wheezing, no rales, no rhonchi  Gastrointestinal: bowel sounds normal, soft, no abdominal tenderness  Musculoskeletal: left hip tenderness  Extremities: trace bilateral pitting edema  Skin: No rash

## 2018-08-22 NOTE — DISCHARGE NOTE ADULT - PATIENT PORTAL LINK FT
You can access the [facility] Patient Portal, offered by [facility], by registering with the following website: [email or web address]

## 2018-08-22 NOTE — H&P ADULT - HISTORY OF PRESENT ILLNESS
72 y.o. female with PMH anxiety, GERD presents with s/p fall. Pt reports sitting in porch reading when she fell asleep. When she woke up, her left leg was numb and was "asleep". She got up and fell on the left side. Pt reports head trauma. Denies LOC. She denies any lightheadedness or dizziness. Denies feeling weak. Pt denies any fever, chills, CP, SOB, abd pain, dysuria or diarrhea. Pt baseline ambulatory, able to do all ADLs, able to climb 2 flights of stairs without any issues. 72 y.o. female with PMH anxiety, GERD presents with s/p fall. Pt reports sitting in porch reading when she fell asleep. When she woke up, her left leg was numb and was "asleep". She got up and fell on the left side. Pt reports head trauma. Denies LOC. Reports pain to left hip. She denies any lightheadedness or dizziness. Denies feeling weak. Pt denies any fever, chills, CP, SOB, abd pain, dysuria or diarrhea. Pt baseline ambulatory, able to do all ADLs, able to climb 2 flights of stairs without any issues. 72 y.o. female with PMH anxiety, GERD presents with s/p fall. Pt reports sitting in porch reading when she fell asleep. When she woke up, her left leg was numb and was "asleep". She got up and fell on the left side. Pt reports head trauma. Denies LOC. Reports pain to left hip. She denies any lightheadedness or dizziness. Denies feeling weak. Pt denies any fever, chills, CP, SOB, abd pain, dysuria or diarrhea. Pt baseline ambulatory, able to do all ADLs, able to climb 2 flights of stairs without any issues.    PMH: as above  PSH: L knee arthroscopy, cholecystectomy, b/l cataracts, tonsillectomy  Social Hx: +tobacco use ~1PPD for 50 yrs, occ EtOH  Family Hx: Mother-rheumatic fever with heart disease; Father-bone cancer  ROS: per HPI

## 2018-08-22 NOTE — DISCHARGE NOTE ADULT - HOSPITAL COURSE
Orthopedic Summary  H&P:  Pt is a 72y Female PAST MEDICAL & SURGICAL HISTORY:  Anxiety        Now s/p LEFT Hip IM Nail for fracture. Pt is afebrile with stable vital signs. Pain is controlled. Exam reveals intact EHL FHL TA GS, +DP. Dressing is clean and dry with a New Aquacel bandage on.  Vital Signs Last 24 Hrs  T(C): 36.8 (08-23-18 @ 11:36), Max: 37.3 (08-22-18 @ 17:28)  T(F): 98.3 (08-23-18 @ 11:36), Max: 99.1 (08-22-18 @ 17:28)  HR: 88 (08-23-18 @ 11:36) (86 - 104)  BP: 101/60 (08-23-18 @ 11:36) (101/60 - 154/65)  BP(mean): --  RR: 18 (08-23-18 @ 11:36) (12 - 18)  SpO2: 96% (08-23-18 @ 11:36) (95% - 100%)                        9.0    7.38  )-----------( 114      ( 23 Aug 2018 06:25 )             27.2     PT/INR - ( 22 Aug 2018 03:54 )   PT: 10.9 sec;   INR: 1.01 ratio         PTT - ( 22 Aug 2018 03:54 )  PTT:29.7 sec    Hospital Course:  Patient presented to [facility] ED after a fall, found to have an intertrochanteric hip fracture, and admitted to the Medical Service. Pt was  medically/cardiac cleared prior to surgery. Prophylactic antibiotics were started before the procedure and continued for 24 hours. They were admitted after surgery to the orthopedic floor.  There were no complications during the hospital stay. All home medications were continued.    Routine consults were obtained from the Anticoagulation Team for DVT/PE prophylaxis, from Physical Therapy for twice daily PT, and followed by Medicine for Co-management. Patient was placed on LOVEONOX for anticoagulation.  Pertinent home medications were continued.  Daily labs were followed.      On POD 0 there were no major issues. Pt received PT daily, and a new Aquacel dressing was applied prior to discharge. The pt will likely need DC to Rehab for ongoing PT once evaluated and ok per Medicine.   The orthopedic Attending is aware and agrees. See addendum to DC summary per medical team below for any additional info or if any changes. Orthopedic Summary  H&P:  Pt is a 72y Female PAST MEDICAL & SURGICAL HISTORY:  Anxiety        Now s/p LEFT Hip IM Nail for fracture. Pt is afebrile with stable vital signs. Pain is controlled. Exam reveals intact EHL FHL TA GS, +DP. Dressing is clean and dry with a New Aquacel bandage on.    Vital Signs Last 24 Hrs  T(C): 37.1 (24 Aug 2018 23:35), Max: 37.3 (24 Aug 2018 17:05)  T(F): 98.7 (24 Aug 2018 23:35), Max: 99.2 (24 Aug 2018 17:05)  HR: 92 (24 Aug 2018 23:35) (92 - 112)  BP: 109/57 (24 Aug 2018 23:35) (97/56 - 120/59)  BP(mean): --  RR: 16 (24 Aug 2018 23:35) (16 - 16)  SpO2: 95% (24 Aug 2018 23:35) (95% - 100%)                        9.4    6.66  )-----------( 109      ( 25 Aug 2018 05:20 )             28.5   08-25    141  |  104  |  15  ----------------------------<  92  4.0   |  28  |  0.61    Ca    8.4<L>      25 Aug 2018 05:20                  Hospital Course:  Patient presented to [facility] ED after a fall, found to have an intertrochanteric hip fracture, and admitted to the Medical Service. Pt was  medically/cardiac cleared prior to surgery. Prophylactic antibiotics were started before the procedure and continued for 24 hours. They were admitted after surgery to the orthopedic floor.  There were no complications during the hospital stay. All home medications were continued.    Routine consults were obtained from the Anticoagulation Team for DVT/PE prophylaxis, from Physical Therapy for twice daily PT, and followed by Medicine for Co-management. Patient was placed on LOVEONOX for anticoagulation.  Pertinent home medications were continued.  Daily labs were followed.      On POD 0 there were no major issues. Pt received PT daily, and a new Aquacel dressing was applied prior to discharge. The pt will likely need DC to Rehab for ongoing PT once evaluated and ok per Medicine.   The orthopedic Attending is aware and agrees. See addendum to DC summary per medical team below for any additional info or if any changes.

## 2018-08-22 NOTE — PROGRESS NOTE ADULT - ASSESSMENT
A/P: 72F with left hip fracture  Plan for OR today with Dr. Eason for Left femur IMN  NPO after breakfast  IVFs while NPO  Hold chemical dvt prophylaxis  SCDs  Pain control  NWB LLE, bedrest  FU labs  Medical optimization for OR

## 2018-08-22 NOTE — DISCHARGE NOTE ADULT - FUNCTIONAL SCREEN CURRENT LEVEL: AMBULATION, MLM
Patient understands condition, prognosis and need for follow up care. (0) independent (3) assistive equipment and person

## 2018-08-22 NOTE — DISCHARGE NOTE ADULT - MEDICATION SUMMARY - MEDICATIONS TO STOP TAKING
I will STOP taking the medications listed below when I get home from the hospital:    Bactrim  mg-160 mg oral tablet  -- 1 tab(s) by mouth every 12 hours   -- Avoid prolonged or excessive exposure to direct and/or artificial sunlight while taking this medication.  Finish all this medication unless otherwise directed by prescriber.  Medication should be taken with plenty of water.

## 2018-08-22 NOTE — DISCHARGE NOTE ADULT - CARE PROVIDER_API CALL
Cesar Eason (DO), Orthopaedic Surgery  [address] [place] [address]  Phone: [phone]  Fax: [phone] Cesar Eason (DO), Orthopaedic Surgery  [address] [place] [address]  Phone: [phone]  Fax: [phone]    Amico, Frank J (DO), Internal Medicine  [address] [address]  Phone: [phone]  Fax: [phone]

## 2018-08-23 LAB
ANION GAP SERPL CALC-SCNC: 7 MMOL/L — SIGNIFICANT CHANGE UP (ref 5–17)
APPEARANCE UR: CLEAR — SIGNIFICANT CHANGE UP
BILIRUB UR-MCNC: NEGATIVE — SIGNIFICANT CHANGE UP
BUN SERPL-MCNC: 13 MG/DL — SIGNIFICANT CHANGE UP (ref 7–23)
CALCIUM SERPL-MCNC: 8.2 MG/DL — LOW (ref 8.5–10.1)
CHLORIDE SERPL-SCNC: 106 MMOL/L — SIGNIFICANT CHANGE UP (ref 96–108)
CO2 SERPL-SCNC: 26 MMOL/L — SIGNIFICANT CHANGE UP (ref 22–31)
COLOR SPEC: YELLOW — SIGNIFICANT CHANGE UP
CREAT SERPL-MCNC: 0.73 MG/DL — SIGNIFICANT CHANGE UP (ref 0.5–1.3)
DIFF PNL FLD: NEGATIVE — SIGNIFICANT CHANGE UP
GLUCOSE SERPL-MCNC: 114 MG/DL — HIGH (ref 70–99)
GLUCOSE UR QL: NEGATIVE MG/DL — SIGNIFICANT CHANGE UP
HCT VFR BLD CALC: 27.2 % — LOW (ref 34.5–45)
HGB BLD-MCNC: 9 G/DL — LOW (ref 11.5–15.5)
KETONES UR-MCNC: ABNORMAL
LEUKOCYTE ESTERASE UR-ACNC: NEGATIVE — SIGNIFICANT CHANGE UP
MCHC RBC-ENTMCNC: 30.8 PG — SIGNIFICANT CHANGE UP (ref 27–34)
MCHC RBC-ENTMCNC: 33.1 GM/DL — SIGNIFICANT CHANGE UP (ref 32–36)
MCV RBC AUTO: 93.2 FL — SIGNIFICANT CHANGE UP (ref 80–100)
NITRITE UR-MCNC: NEGATIVE — SIGNIFICANT CHANGE UP
NRBC # BLD: 0 /100 WBCS — SIGNIFICANT CHANGE UP (ref 0–0)
PH UR: 5 — SIGNIFICANT CHANGE UP (ref 5–8)
PLATELET # BLD AUTO: 114 K/UL — LOW (ref 150–400)
POTASSIUM SERPL-MCNC: 4.1 MMOL/L — SIGNIFICANT CHANGE UP (ref 3.5–5.3)
POTASSIUM SERPL-SCNC: 4.1 MMOL/L — SIGNIFICANT CHANGE UP (ref 3.5–5.3)
PROT UR-MCNC: NEGATIVE MG/DL — SIGNIFICANT CHANGE UP
RBC # BLD: 2.92 M/UL — LOW (ref 3.8–5.2)
RBC # FLD: 12.3 % — SIGNIFICANT CHANGE UP (ref 10.3–14.5)
SODIUM SERPL-SCNC: 139 MMOL/L — SIGNIFICANT CHANGE UP (ref 135–145)
SP GR SPEC: 1.02 — SIGNIFICANT CHANGE UP (ref 1.01–1.02)
UROBILINOGEN FLD QL: NEGATIVE MG/DL — SIGNIFICANT CHANGE UP
WBC # BLD: 7.38 K/UL — SIGNIFICANT CHANGE UP (ref 3.8–10.5)
WBC # FLD AUTO: 7.38 K/UL — SIGNIFICANT CHANGE UP (ref 3.8–10.5)

## 2018-08-23 PROCEDURE — 99223 1ST HOSP IP/OBS HIGH 75: CPT

## 2018-08-23 RX ORDER — ENOXAPARIN SODIUM 100 MG/ML
40 INJECTION SUBCUTANEOUS
Qty: 0 | Refills: 0 | COMMUNITY
Start: 2018-08-23

## 2018-08-23 RX ADMIN — Medication 100 MILLIGRAM(S): at 13:39

## 2018-08-23 RX ADMIN — Medication 100 MILLIGRAM(S): at 11:28

## 2018-08-23 RX ADMIN — LORATADINE 10 MILLIGRAM(S): 10 TABLET ORAL at 07:11

## 2018-08-23 RX ADMIN — HYDROMORPHONE HYDROCHLORIDE 0.5 MILLIGRAM(S): 2 INJECTION INTRAMUSCULAR; INTRAVENOUS; SUBCUTANEOUS at 17:02

## 2018-08-23 RX ADMIN — SODIUM CHLORIDE 3 MILLILITER(S): 9 INJECTION INTRAMUSCULAR; INTRAVENOUS; SUBCUTANEOUS at 13:35

## 2018-08-23 RX ADMIN — Medication 1 TABLET(S): at 11:28

## 2018-08-23 RX ADMIN — Medication 100 MILLIGRAM(S): at 03:16

## 2018-08-23 RX ADMIN — PANTOPRAZOLE SODIUM 40 MILLIGRAM(S): 20 TABLET, DELAYED RELEASE ORAL at 07:11

## 2018-08-23 RX ADMIN — SODIUM CHLORIDE 3 MILLILITER(S): 9 INJECTION INTRAMUSCULAR; INTRAVENOUS; SUBCUTANEOUS at 21:16

## 2018-08-23 RX ADMIN — ENOXAPARIN SODIUM 40 MILLIGRAM(S): 100 INJECTION SUBCUTANEOUS at 07:11

## 2018-08-23 RX ADMIN — Medication 100 MILLIGRAM(S): at 07:11

## 2018-08-23 RX ADMIN — HYDROMORPHONE HYDROCHLORIDE 0.5 MILLIGRAM(S): 2 INJECTION INTRAMUSCULAR; INTRAVENOUS; SUBCUTANEOUS at 11:34

## 2018-08-23 RX ADMIN — Medication 0.5 MILLIGRAM(S): at 13:39

## 2018-08-23 RX ADMIN — HYDROMORPHONE HYDROCHLORIDE 0.5 MILLIGRAM(S): 2 INJECTION INTRAMUSCULAR; INTRAVENOUS; SUBCUTANEOUS at 21:04

## 2018-08-23 RX ADMIN — HYDROMORPHONE HYDROCHLORIDE 0.5 MILLIGRAM(S): 2 INJECTION INTRAMUSCULAR; INTRAVENOUS; SUBCUTANEOUS at 07:22

## 2018-08-23 RX ADMIN — Medication 1 MILLIGRAM(S): at 07:11

## 2018-08-23 RX ADMIN — Medication 1 MILLIGRAM(S): at 17:02

## 2018-08-23 NOTE — PHYSICAL THERAPY INITIAL EVALUATION ADULT - MODALITIES TREATMENT COMMENTS
The pt responded well to transfer tx, ambulation tx, therex review and bed mobility tx. The pt was limited by apprehension and reactivity with left LE a/aa/prom. The pt was left sitting OOB and in a chair with chair alarm secured.

## 2018-08-23 NOTE — PHYSICAL THERAPY INITIAL EVALUATION ADULT - ADDITIONAL COMMENTS
As per H&P:  Pt baseline ambulatory, able to do all ADLs, able to climb 2 flights of stairs without any issues.

## 2018-08-23 NOTE — CONSULT NOTE ADULT - ASSESSMENT
A: 71 yo female ,  s/p fall from chair closed fracture of  Left Hip. S/P left ORIF 8/22. Consulted for anticoagulation management pot-op. Had been on Heparin sq  Q 8 hours prior to surgery.       P: Lovenox 40 mg daily x 4 weeks   Daily CBC, BMP,   venodynes ,   increase mobility

## 2018-08-23 NOTE — PROGRESS NOTE ADULT - SUBJECTIVE AND OBJECTIVE BOX
Pt S/E at bedside, no acute events overnight, pain controlled    Vital Signs Last 24 Hrs  T(C): 36.9 (23 Aug 2018 03:33), Max: 37.3 (22 Aug 2018 17:28)  T(F): 98.4 (23 Aug 2018 03:33), Max: 99.1 (22 Aug 2018 17:28)  HR: 86 (23 Aug 2018 03:33) (84 - 104)  BP: 112/57 (23 Aug 2018 03:33) (97/52 - 154/65)  RR: 18 (23 Aug 2018 03:33) (12 - 18)  SpO2: 100% (23 Aug 2018 03:33) (95% - 100%)  Gen: NAD, AAOx3    Left Lower Extremity:  Dressing clean dry intact  +EHL/FHL/TA/GS  SILT L3-S1  +DP/PT Pulses  Compartments soft  No calf TTP B/L

## 2018-08-23 NOTE — CONSULT NOTE ADULT - SUBJECTIVE AND OBJECTIVE BOX
HPI:  72 y.o. female with PMH anxiety, GERD presents with s/p fall. Pt reports sitting in porch reading when she fell asleep. When she woke up, her left leg was numb and was "asleep". She got up and fell on the left side. Pt reports head trauma. Denies LOC. Reports pain to left hip. She denies any lightheadedness or dizziness. Denies feeling weak. Pt denies any fever, chills, CP, SOB, abd pain, dysuria or diarrhea. Pt baseline ambulatory, able to do all ADLs, able to climb 2 flights of stairs without any issues.    PMH: as above  PSH: L knee arthroscopy, cholecystectomy, b/l cataracts, tonsillectomy  Social Hx: +tobacco use ~1PPD for 50 yrs, occ EtOH  Family Hx: Mother-rheumatic fever with heart disease; Father-bone cancer  ROS: per HPI (22 Aug 2018 01:49)      Patient is a 72y old  Female who presents with a chief complaint of s/p fall (22 Aug 2018 22:07)      Consulted by Dr. Pickard for VTE prophylaxis, risk stratification, and anticoagulation management.    PAST MEDICAL & SURGICAL HISTORY:  Anxiety          CrCl:31.32    Caprini VTE Risk Score: 7        IMPROVE Bleeding Risk Score 1.5    Falls Risk:   High (x  )  Mod (  )  Low (  )      FAMILY HISTORY:    Denies any personal or familial history of clotting or bleeding disorders.    Allergies:  aspirin (Swelling)  Bactrim (Unknown)  erythromycin (Hives)  NSAIDs (Swelling)  penicillins (Hives)  Zithromax (Hives)    Intolerances        REVIEW OF SYSTEMS    (  )Fever	     (  )Constipation	(  )SOB				(  )Headache	(  )Dysuria  (  )Chills	     (  )Melena	(  )Dyspnea present on exertion	                    (  )Dizziness                    (  )Polyuria  (  )Nausea	     (  )Hematochezia	(  )Cough			                    (  )Syncope   	(  )Hematuria  (  )Vomiting    (  )Chest Pain	(  )Wheezing			(  )Weakness  (  )Diarrhea     (  )Palpitations	(  )Anorexia			(  )Myalgia    All other review of systems negative: Yes      PHYSICAL EXAM:    Constitutional: Appears WD, WN, 73 yo female     Neurological: A& O x 4    Skin: Warm, dry    Respiratory and Thorax: normal effort; Breath sounds: normal; No rales/wheezing/rhonchi  	  Cardiovascular: S1, S2, regular, NMBR	    Gastrointestinal: BS + x 4Q, nontender	    Genitourinary:  Bladder nondistended, nontender    Musculoskeletal:   General Right:   no muscle/joint tenderness,   normal tone, no joint swelling,   ROM: full	    General Left:   no muscle/joint tenderness,   normal tone, no joint swelling,   ROM: limited    Hip: Left: Dressing CDI       Lower extrems:   Right: no calf tenderness              negative yoandy's sign               + pedal pulses    Left:   no calf tenderness              negative yoandy's sign               + pedal pulses                          9.0    7.38  )-----------( 114      ( 23 Aug 2018 06:25 )             27.2       08-23    139  |  106  |  13  ----------------------------<  114<H>  4.1   |  26  |  0.73    Ca    8.2<L>      23 Aug 2018 06:25    TPro  7.3  /  Alb  3.9  /  TBili  0.2  /  DBili  x   /  AST  15  /  ALT  20  /  AlkPhos  77  08-21      PT/INR - ( 22 Aug 2018 03:54 )   PT: 10.9 sec;   INR: 1.01 ratio         PTT - ( 22 Aug 2018 03:54 )  PTT:29.7 sec		          CT of HEad:    IMPRESSION:    No acute intracranial hemorrhage, mass effect, or CT evidence of a large   vascular territory infarct.If clinically indicated, short-term follow-up   or MRI may be obtained for further evaluation provided no MR   contraindications.  		    MEDICATIONS  (STANDING):  clonazePAM Tablet 0.5 milliGRAM(s) Oral <User Schedule>  clonazePAM Tablet 1 milliGRAM(s) Oral two times a day  docusate sodium 100 milliGRAM(s) Oral three times a day  enoxaparin Injectable 40 milliGRAM(s) SubCutaneous every 24 hours  lactated ringers. 1000 milliLiter(s) IV Continuous <Continuous>  lactated ringers. 1000 milliLiter(s) IV Continuous <Continuous>  multivitamin 1 Tablet(s) Oral daily  pantoprazole    Tablet 40 milliGRAM(s) Oral before breakfast  sodium chloride 0.9% lock flush 3 milliLiter(s) IV Push every 8 hours    Vital Signs Last 24 Hrs  T(C): 36.8 (08-23-18 @ 11:36), Max: 37.3 (08-22-18 @ 17:28)  T(F): 98.3 (08-23-18 @ 11:36), Max: 99.1 (08-22-18 @ 17:28)  HR: 88 (08-23-18 @ 11:36) (86 - 104)  BP: 101/60 (08-23-18 @ 11:36) (101/60 - 154/65)  BP(mean): --  RR: 18 (08-23-18 @ 11:36) (12 - 18)  SpO2: 96% (08-23-18 @ 11:36) (95% - 100%)		      Heparin SQ           (  )  Coumadin             (  )  Xarelto                  (  )  Eliquis                   (  )  Venodynes           (  )  Ambulation          (x  )  UFH                     (  )  Contraindicated    (  )  LMWH                 ( x )  DVT Prophylaxis    (x  )

## 2018-08-23 NOTE — PHYSICAL THERAPY INITIAL EVALUATION ADULT - GENERAL OBSERVATIONS, REHAB EVAL
The pt was pleasant and cooperative with spouse present bedside. The pt was eager to ambulate with PT but reported that she felt very afraid of causing pain and about falling again.

## 2018-08-23 NOTE — PHYSICAL THERAPY INITIAL EVALUATION ADULT - PERTINENT HX OF CURRENT PROBLEM, REHAB EVAL
72 y.o. female with PMH anxiety, GERD presents with s/p fall. Pt reports sitting in porTwist reading when she fell asleep. When she woke up, her left leg was numb and was "asleep". She got up and fell on the left side.

## 2018-08-23 NOTE — PROGRESS NOTE ADULT - SUBJECTIVE AND OBJECTIVE BOX
c/c: fall    HPI:  72 y.o. female with PMH anxiety, GERD presents with s/p fall. Pt reports sitting in porch reading when she fell asleep. When she woke up, her left leg was numb and was "asleep". She got up and fell on the left side. Pt reports head trauma. Denies LOC. Reports pain to left hip. She denies any lightheadedness or dizziness. Denies feeling weak. Pt denies any fever, chills, CP, SOB, abd pain, dysuria or diarrhea. Pt baseline ambulatory, able to do all ADLs, able to climb 2 flights of stairs without any issues.She weas admitted with left hip fracture. Underwent IM nail .    : pt seen and examined this am. Doing well. Did well with physical therapy. Anticipating dc home when ready. c/o pain at surgical site. NO sob/chest pain.      Review of system- All 10 systems reviewed and is as per HPI otherwise negative.     vitals:  T(C): 36.8 (18 @ 11:36), Max: 37.3 (18 @ 17:28)  HR: 88 (18 @ 11:36) (86 - 104)  BP: 101/60 (18 @ 11:36) (101/60 - 154/65)  RR: 18 (18 @ 11:36) (12 - 18)  SpO2: 96% (18 @ 11:36) (95% - 100%)      PHYSICAL EXAM:    GENERAL: Comfortable, no acute distress  HEAD:  Atraumatic, Normocephalic  EYES: EOMI, PERRLA  HEENT: Moist mucous membranes  NECK: Supple, No JVD  NERVOUS SYSTEM:  Alert & Oriented X3, non focal.   CHEST/LUNG: Clear to auscultation bilaterally  HEART: Regular rate and rhythm; No murmurs, rubs, or gallops  ABDOMEN: Soft, Nontender, Nondistended; Bowel sounds present  GENITOURINARY- Voiding, no palpable bladder  EXTREMITIES:  No clubbing, cyanosis, or edema  MUSCULOSKELETAL- LLE dressing intact.  SKIN-no rash        LABS:                        9.0    7.38  )-----------( 114      ( 23 Aug 2018 06:25 )             27.2         139  |  106  |  13  ----------------------------<  114<H>  4.1   |  26  |  0.73    Ca    8.2<L>      23 Aug 2018 06:25    TPro  7.3  /  Alb  3.9  /  TBili  0.2  /  DBili  x   /  AST  15  /  ALT  20  /  AlkPhos  77  08-21    PT/INR - ( 22 Aug 2018 03:54 )   PT: 10.9 sec;   INR: 1.01 ratio         PTT - ( 22 Aug 2018 03:54 )  PTT:29.7 sec  Urinalysis Basic - ( 23 Aug 2018 01:50 )    Color: Yellow / Appearance: Clear / S.020 / pH: x  Gluc: x / Ketone: Moderate  / Bili: Negative / Urobili: Negative mg/dL   Blood: x / Protein: Negative mg/dL / Nitrite: Negative   Leuk Esterase: Negative / RBC: x / WBC x   Sq Epi: x / Non Sq Epi: x / Bacteria: x          MEDS  acetaminophen   Tablet 650 milliGRAM(s) Oral every 6 hours PRN  acetaminophen   Tablet. 650 milliGRAM(s) Oral every 6 hours PRN  benzocaine 15 mG/menthol 3.6 mG Lozenge 1 Lozenge Oral every 3 hours PRN  clonazePAM Tablet 0.5 milliGRAM(s) Oral <User Schedule>  clonazePAM Tablet 1 milliGRAM(s) Oral two times a day  diphenhydrAMINE   Capsule 25 milliGRAM(s) Oral every 6 hours PRN  diphenhydrAMINE   Capsule 25 milliGRAM(s) Oral at bedtime PRN  docusate sodium 100 milliGRAM(s) Oral three times a day  enoxaparin Injectable 40 milliGRAM(s) SubCutaneous every 24 hours  HYDROmorphone  Injectable 0.5 milliGRAM(s) SubCutaneous every 3 hours PRN  lactated ringers. 1000 milliLiter(s) IV Continuous <Continuous>  lactated ringers. 1000 milliLiter(s) IV Continuous <Continuous>  loratadine 10 milliGRAM(s) Oral daily PRN  morphine  - Injectable 2 milliGRAM(s) IV Push every 4 hours PRN  multivitamin 1 Tablet(s) Oral daily  ondansetron Injectable 4 milliGRAM(s) IV Push every 6 hours PRN  ondansetron Injectable 4 milliGRAM(s) IV Push every 6 hours PRN  oxyCODONE    IR 10 milliGRAM(s) Oral every 4 hours PRN  oxyCODONE    IR 5 milliGRAM(s) Oral every 4 hours PRN  pantoprazole    Tablet 40 milliGRAM(s) Oral before breakfast  sodium chloride 0.9% lock flush 3 milliLiter(s) IV Push every 8 hours    ASSESSMENT AND PLAN:  72F, PMH as above a/w:    1. Mechanical fall/left hip fracture:  -s/p IM nail pod#1  -pain control  -physical therapy  -incentive spirometry.    2. Acute blood loss anemia:  -due to surgery  -no need for transfusion at this time  -repeat h/h in am.     3. Anxiety  -cont klonipin 1mg AM, 0.5mg mid-day, 1mg PM    4. Tobacco use d/o  -encouraged smoking cessation  -pt currently refused nicotine patch    5.GERD  -on PPI

## 2018-08-23 NOTE — PROGRESS NOTE ADULT - ASSESSMENT
A/P: 72F w/ L IT Fx s/p IMN POD1  Pain Control  DVT ppx  WBAT  PT  Incentive Spirometry  Medical management appreciated  DC planning

## 2018-08-24 LAB
ANION GAP SERPL CALC-SCNC: 7 MMOL/L — SIGNIFICANT CHANGE UP (ref 5–17)
BUN SERPL-MCNC: 17 MG/DL — SIGNIFICANT CHANGE UP (ref 7–23)
CALCIUM SERPL-MCNC: 8 MG/DL — LOW (ref 8.5–10.1)
CHLORIDE SERPL-SCNC: 105 MMOL/L — SIGNIFICANT CHANGE UP (ref 96–108)
CO2 SERPL-SCNC: 28 MMOL/L — SIGNIFICANT CHANGE UP (ref 22–31)
CREAT SERPL-MCNC: 0.7 MG/DL — SIGNIFICANT CHANGE UP (ref 0.5–1.3)
GLUCOSE SERPL-MCNC: 96 MG/DL — SIGNIFICANT CHANGE UP (ref 70–99)
HCT VFR BLD CALC: 21.4 % — LOW (ref 34.5–45)
HGB BLD-MCNC: 7.3 G/DL — LOW (ref 11.5–15.5)
MCHC RBC-ENTMCNC: 31.2 PG — SIGNIFICANT CHANGE UP (ref 27–34)
MCHC RBC-ENTMCNC: 34.1 GM/DL — SIGNIFICANT CHANGE UP (ref 32–36)
MCV RBC AUTO: 91.5 FL — SIGNIFICANT CHANGE UP (ref 80–100)
NRBC # BLD: 0 /100 WBCS — SIGNIFICANT CHANGE UP (ref 0–0)
PLATELET # BLD AUTO: 118 K/UL — LOW (ref 150–400)
POTASSIUM SERPL-MCNC: 3.8 MMOL/L — SIGNIFICANT CHANGE UP (ref 3.5–5.3)
POTASSIUM SERPL-SCNC: 3.8 MMOL/L — SIGNIFICANT CHANGE UP (ref 3.5–5.3)
RBC # BLD: 2.34 M/UL — LOW (ref 3.8–5.2)
RBC # FLD: 12.6 % — SIGNIFICANT CHANGE UP (ref 10.3–14.5)
SODIUM SERPL-SCNC: 140 MMOL/L — SIGNIFICANT CHANGE UP (ref 135–145)
WBC # BLD: 6.48 K/UL — SIGNIFICANT CHANGE UP (ref 3.8–10.5)
WBC # FLD AUTO: 6.48 K/UL — SIGNIFICANT CHANGE UP (ref 3.8–10.5)

## 2018-08-24 PROCEDURE — 99233 SBSQ HOSP IP/OBS HIGH 50: CPT

## 2018-08-24 RX ORDER — ENOXAPARIN SODIUM 100 MG/ML
40 INJECTION SUBCUTANEOUS
Qty: 14 | Refills: 0 | OUTPATIENT
Start: 2018-08-24 | End: 2018-09-06

## 2018-08-24 RX ADMIN — OXYCODONE HYDROCHLORIDE 5 MILLIGRAM(S): 5 TABLET ORAL at 08:28

## 2018-08-24 RX ADMIN — SODIUM CHLORIDE 3 MILLILITER(S): 9 INJECTION INTRAMUSCULAR; INTRAVENOUS; SUBCUTANEOUS at 12:27

## 2018-08-24 RX ADMIN — SODIUM CHLORIDE 3 MILLILITER(S): 9 INJECTION INTRAMUSCULAR; INTRAVENOUS; SUBCUTANEOUS at 21:19

## 2018-08-24 RX ADMIN — Medication 1 TABLET(S): at 12:30

## 2018-08-24 RX ADMIN — OXYCODONE HYDROCHLORIDE 10 MILLIGRAM(S): 5 TABLET ORAL at 19:21

## 2018-08-24 RX ADMIN — Medication 1 MILLIGRAM(S): at 05:51

## 2018-08-24 RX ADMIN — PANTOPRAZOLE SODIUM 40 MILLIGRAM(S): 20 TABLET, DELAYED RELEASE ORAL at 05:52

## 2018-08-24 RX ADMIN — OXYCODONE HYDROCHLORIDE 5 MILLIGRAM(S): 5 TABLET ORAL at 12:36

## 2018-08-24 RX ADMIN — Medication 1 MILLIGRAM(S): at 17:41

## 2018-08-24 RX ADMIN — Medication 0.5 MILLIGRAM(S): at 12:30

## 2018-08-24 RX ADMIN — ENOXAPARIN SODIUM 40 MILLIGRAM(S): 100 INJECTION SUBCUTANEOUS at 08:28

## 2018-08-24 RX ADMIN — OXYCODONE HYDROCHLORIDE 5 MILLIGRAM(S): 5 TABLET ORAL at 09:20

## 2018-08-24 RX ADMIN — SODIUM CHLORIDE 3 MILLILITER(S): 9 INJECTION INTRAMUSCULAR; INTRAVENOUS; SUBCUTANEOUS at 05:15

## 2018-08-24 RX ADMIN — Medication 100 MILLIGRAM(S): at 12:30

## 2018-08-24 RX ADMIN — OXYCODONE HYDROCHLORIDE 10 MILLIGRAM(S): 5 TABLET ORAL at 19:51

## 2018-08-24 RX ADMIN — OXYCODONE HYDROCHLORIDE 5 MILLIGRAM(S): 5 TABLET ORAL at 13:30

## 2018-08-24 NOTE — PROGRESS NOTE ADULT - SUBJECTIVE AND OBJECTIVE BOX
HPI:  72 y.o. female with PMH anxiety, GERD presents with s/p fall. Pt reports sitting in porch reading when she fell asleep. When she woke up, her left leg was numb and was "asleep". She got up and fell on the left side. Pt reports head trauma. Denies LOC. Reports pain to left hip. She denies any lightheadedness or dizziness. Denies feeling weak. Pt denies any fever, chills, CP, SOB, abd pain, dysuria or diarrhea. Pt baseline ambulatory, able to do all ADLs, able to climb 2 flights of stairs without any issues.    PMH: as above  PSH: L knee arthroscopy, cholecystectomy, b/l cataracts, tonsillectomy  Social Hx: +tobacco use ~1PPD for 50 yrs, occ EtOH  Family Hx: Mother-rheumatic fever with heart disease; Father-bone cancer  ROS: per HPI (22 Aug 2018 01:49)      Patient is a 72y old  Female who presents with a chief complaint of s/p fall (22 Aug 2018 22:07)      Consulted by Dr. Pickard for VTE prophylaxis, risk stratification, and anticoagulation management.    PAST MEDICAL & SURGICAL HISTORY:  Anxiety    8/24:  weepy, upset about being transfused, discussed importance of and is agreeable,  aware she will need to go home on lovenox, unable to take ASA due toallergy      CrCl:31.32    Caprini VTE Risk Score: 7        IMPROVE Bleeding Risk Score 1.5    Falls Risk:   High (x  )  Mod (  )  Low (  )      FAMILY HISTORY:    Denies any personal or familial history of clotting or bleeding disorders.    Allergies:  aspirin (Swelling)  Bactrim (Unknown)  erythromycin (Hives)  NSAIDs (Swelling)  penicillins (Hives)  Zithromax (Hives)    Intolerances        REVIEW OF SYSTEMS    (  )Fever	     (  )Constipation	(  )SOB				(  )Headache	(  )Dysuria  (  )Chills	     (  )Melena	(  )Dyspnea present on exertion	                    (  )Dizziness                    (  )Polyuria  (  )Nausea	     (  )Hematochezia	(  )Cough			                    (  )Syncope   	(  )Hematuria  (  )Vomiting    (  )Chest Pain	(  )Wheezing			(  )Weakness  (  )Diarrhea     (  )Palpitations	(  )Anorexia			(  )Myalgia    All other review of systems negative: Yes      PHYSICAL EXAM:    Constitutional: Appears WD, WN, 73 yo female     Neurological: A& O x 4    Skin: Warm, dry    Respiratory and Thorax: normal effort; Breath sounds: normal; No rales/wheezing/rhonchi  	  Cardiovascular: S1, S2, regular, NMBR	    Gastrointestinal: BS + x 4Q, nontender	    Genitourinary:  Bladder nondistended, nontender    Musculoskeletal:   General Right:   no muscle/joint tenderness,   normal tone, no joint swelling,   ROM: full	    General Left:   no muscle/joint tenderness,   normal tone, no joint swelling,   ROM: limited    Hip: Left: Dressing CDI       Lower extrems:   Right: no calf tenderness              negative yoandy's sign               + pedal pulses    Left:   no calf tenderness              negative yoandy's sign               + pedal pulses                          7.3    6.48  )-----------( 118      ( 24 Aug 2018 04:59 )             21.4       08-24    140  |  105  |  17  ----------------------------<  96  3.8   |  28  |  0.70    Ca    8.0<L>      24 Aug 2018 04:59                                    9.0    7.38  )-----------( 114      ( 23 Aug 2018 06:25 )             27.2       08-23    139  |  106  |  13  ----------------------------<  114<H>  4.1   |  26  |  0.73    Ca    8.2<L>      23 Aug 2018 06:25    TPro  7.3  /  Alb  3.9  /  TBili  0.2  /  DBili  x   /  AST  15  /  ALT  20  /  AlkPhos  77  08-21      PT/INR - ( 22 Aug 2018 03:54 )   PT: 10.9 sec;   INR: 1.01 ratio         PTT - ( 22 Aug 2018 03:54 )  PTT:29.7 sec		          CT of HEad:    IMPRESSION:    No acute intracranial hemorrhage, mass effect, or CT evidence of a large   vascular territory infarct.If clinically indicated, short-term follow-up   or MRI may be obtained for further evaluation provided no MR   contraindications.  		  MEDICATIONS  (STANDING):  clonazePAM Tablet 0.5 milliGRAM(s) Oral <User Schedule>  clonazePAM Tablet 1 milliGRAM(s) Oral two times a day  docusate sodium 100 milliGRAM(s) Oral three times a day  enoxaparin Injectable 40 milliGRAM(s) SubCutaneous every 24 hours  lactated ringers. 1000 milliLiter(s) IV Continuous <Continuous>  lactated ringers. 1000 milliLiter(s) IV Continuous <Continuous>  multivitamin 1 Tablet(s) Oral daily  pantoprazole    Tablet 40 milliGRAM(s) Oral before breakfast  sodium chloride 0.9% lock flush 3 milliLiter(s) IV Push every 8 hours        Vital Signs Last 24 Hrs  T(C): 37.1 (08-24-18 @ 04:45), Max: 37.1 (08-24-18 @ 04:45)  T(F): 98.8 (08-24-18 @ 04:45), Max: 98.8 (08-24-18 @ 04:45)  HR: 101 (08-24-18 @ 04:45) (88 - 101)  BP: 116/52 (08-24-18 @ 04:45) (100/63 - 116/52)  BP(mean): --  RR: 16 (08-24-18 @ 04:45) (16 - 18)  SpO2: 95% (08-24-18 @ 04:45) (95% - 100%)    Heparin SQ           (  )  Coumadin             (  )  Xarelto                  (  )  Eliquis                   (  )  Venodynes           (  )  Ambulation          (x  )  UFH                     (  )  Contraindicated    (  )  LMWH                 ( x )  DVT Prophylaxis    (x  )

## 2018-08-24 NOTE — PROGRESS NOTE ADULT - SUBJECTIVE AND OBJECTIVE BOX
c/c: fall    HPI:  72 y.o. female with PMH anxiety, GERD presents with s/p fall. Pt reports sitting in porch reading when she fell asleep. When she woke up, her left leg was numb and was "asleep". She got up and fell on the left side. Pt reports head trauma. Denies LOC. Reports pain to left hip. She denies any lightheadedness or dizziness. Denies feeling weak. Pt denies any fever, chills, CP, SOB, abd pain, dysuria or diarrhea. Pt baseline ambulatory, able to do all ADLs, able to climb 2 flights of stairs without any issues.She weas admitted with left hip fracture. Underwent IM nail .    : pt seen and examined. Feels weak, tired and in pain. Hgb 7.3 today.    Review of system- All 10 systems reviewed and is as per HPI otherwise negative.     Vital Signs Last 24 Hrs  T(C): 37.1 (24 Aug 2018 04:45), Max: 37.1 (24 Aug 2018 04:45)  T(F): 98.8 (24 Aug 2018 04:45), Max: 98.8 (24 Aug 2018 04:45)  HR: 101 (24 Aug 2018 04:45) (88 - 101)  BP: 116/52 (24 Aug 2018 04:45) (100/63 - 116/52)  RR: 16 (24 Aug 2018 04:45) (16 - 18)  SpO2: 95% (24 Aug 2018 04:45) (95% - 100%)    PHYSICAL EXAM:    GENERAL: Comfortable, no acute distress  HEAD:  Atraumatic, Normocephalic  EYES: EOMI, PERRLA  HEENT: Moist mucous membranes  NECK: Supple, No JVD  NERVOUS SYSTEM:  Alert & Oriented X3, non focal.   CHEST/LUNG: Clear to auscultation bilaterally  HEART: Regular rate and rhythm; No murmurs, rubs, or gallops  ABDOMEN: Soft, Nontender, Nondistended; Bowel sounds present  GENITOURINARY- Voiding, no palpable bladder  EXTREMITIES:  No clubbing, cyanosis, or edema  MUSCULOSKELETAL- LLE dressing intact.  SKIN-no rash  LABS:                        7.3    6.48  )-----------( 118      ( 24 Aug 2018 04:59 )             21.4     08-    140  |  105  |  17  ----------------------------<  96  3.8   |  28  |  0.70    Ca    8.0<L>      24 Aug 2018 04:59        Urinalysis Basic - ( 23 Aug 2018 01:50 )    Color: Yellow / Appearance: Clear / S.020 / pH: x  Gluc: x / Ketone: Moderate  / Bili: Negative / Urobili: Negative mg/dL   Blood: x / Protein: Negative mg/dL / Nitrite: Negative   Leuk Esterase: Negative / RBC: x / WBC x   Sq Epi: x / Non Sq Epi: x / Bacteria: x          MEDS  acetaminophen   Tablet 650 milliGRAM(s) Oral every 6 hours PRN  acetaminophen   Tablet. 650 milliGRAM(s) Oral every 6 hours PRN  benzocaine 15 mG/menthol 3.6 mG Lozenge 1 Lozenge Oral every 3 hours PRN  clonazePAM Tablet 0.5 milliGRAM(s) Oral <User Schedule>  clonazePAM Tablet 1 milliGRAM(s) Oral two times a day  diphenhydrAMINE   Capsule 25 milliGRAM(s) Oral every 6 hours PRN  diphenhydrAMINE   Capsule 25 milliGRAM(s) Oral at bedtime PRN  docusate sodium 100 milliGRAM(s) Oral three times a day  enoxaparin Injectable 40 milliGRAM(s) SubCutaneous every 24 hours  HYDROmorphone  Injectable 0.5 milliGRAM(s) SubCutaneous every 3 hours PRN  lactated ringers. 1000 milliLiter(s) IV Continuous <Continuous>  lactated ringers. 1000 milliLiter(s) IV Continuous <Continuous>  loratadine 10 milliGRAM(s) Oral daily PRN  morphine  - Injectable 2 milliGRAM(s) IV Push every 4 hours PRN  multivitamin 1 Tablet(s) Oral daily  ondansetron Injectable 4 milliGRAM(s) IV Push every 6 hours PRN  ondansetron Injectable 4 milliGRAM(s) IV Push every 6 hours PRN  oxyCODONE    IR 10 milliGRAM(s) Oral every 4 hours PRN  oxyCODONE    IR 5 milliGRAM(s) Oral every 4 hours PRN  pantoprazole    Tablet 40 milliGRAM(s) Oral before breakfast  sodium chloride 0.9% lock flush 3 milliLiter(s) IV Push every 8 hours    ASSESSMENT AND PLAN:  72F, PMH as above a/w:    1. Mechanical fall/left hip fracture:  -s/p IM nail pod#2  -pain control  -physical therapy  -incentive spirometry.    2. Acute blood loss anemia:  -due to surgery  -being transfused 2 units today.     3. Anxiety  -cont klonipin 1mg AM, 0.5mg mid-day, 1mg PM    4. Tobacco use d/o  -encouraged smoking cessation  -pt currently refused nicotine patch    5.GERD  -on PPI    6. Dispo:  PT re-eval after transfusion, may need rehab if does not improve.  DC planning tomorrow if h/h stable and able to go home instead of rehab.

## 2018-08-24 NOTE — PROGRESS NOTE ADULT - SUBJECTIVE AND OBJECTIVE BOX
Pt S/E at bedside, no acute events overnight, pain controlled.      Vital Signs Last 24 Hrs  T(C): 37.1 (24 Aug 2018 04:45), Max: 37.1 (24 Aug 2018 04:45)  T(F): 98.8 (24 Aug 2018 04:45), Max: 98.8 (24 Aug 2018 04:45)  HR: 101 (24 Aug 2018 04:45) (88 - 101)  BP: 116/52 (24 Aug 2018 04:45) (100/63 - 116/52)  BP(mean): --  RR: 16 (24 Aug 2018 04:45) (16 - 18)  SpO2: 95% (24 Aug 2018 04:45) (95% - 100%)      Left Lower Extremity:  Dressing clean dry intact  Aquacell removed. Staples intact. No drainage.  New aquacell placed.   +EHL/FHL/TA/GS  SILT L3-S1  +DP/PT Pulses  Compartments soft  No calf TTP B/L

## 2018-08-24 NOTE — PROGRESS NOTE ADULT - ASSESSMENT
A: 73 yo female ,  s/p fall from chair closed fracture of  Left Hip. S/P left ORIF 8/22. high thrombosis risk due to age, surgery and immobility    P: Lovenox 40 mg daily x 4 weeks   Daily CBC, BMP,   venodynes ,   increase mobility A: 71 yo female ,  s/p fall from chair closed fracture of  Left Hip. S/P left ORIF 8/22. high thrombosis risk due to age, surgery and immobility    P: Lovenox 40 mg daily x 4 weeks , [facility] to [facility] for 14 syringes with 1 refill  Daily CBC, BMP,   venodynes ,   increase mobility

## 2018-08-24 NOTE — PROGRESS NOTE ADULT - ASSESSMENT
A/P: 72F w/ L IT Fx s/p IMN POD2    Pain Control as needed  DVT ppx  WBAT  PT  Incentive Spirometry  Medical management appreciated  DC planning Home today if clears PT.

## 2018-08-25 VITALS
TEMPERATURE: 98 F | OXYGEN SATURATION: 100 % | DIASTOLIC BLOOD PRESSURE: 67 MMHG | HEART RATE: 103 BPM | SYSTOLIC BLOOD PRESSURE: 148 MMHG | RESPIRATION RATE: 16 BRPM

## 2018-08-25 LAB
ANION GAP SERPL CALC-SCNC: 9 MMOL/L — SIGNIFICANT CHANGE UP (ref 5–17)
BUN SERPL-MCNC: 15 MG/DL — SIGNIFICANT CHANGE UP (ref 7–23)
CALCIUM SERPL-MCNC: 8.4 MG/DL — LOW (ref 8.5–10.1)
CHLORIDE SERPL-SCNC: 104 MMOL/L — SIGNIFICANT CHANGE UP (ref 96–108)
CO2 SERPL-SCNC: 28 MMOL/L — SIGNIFICANT CHANGE UP (ref 22–31)
CREAT SERPL-MCNC: 0.61 MG/DL — SIGNIFICANT CHANGE UP (ref 0.5–1.3)
GLUCOSE SERPL-MCNC: 92 MG/DL — SIGNIFICANT CHANGE UP (ref 70–99)
HCT VFR BLD CALC: 28.5 % — LOW (ref 34.5–45)
HGB BLD-MCNC: 9.4 G/DL — LOW (ref 11.5–15.5)
MCHC RBC-ENTMCNC: 28.1 PG — SIGNIFICANT CHANGE UP (ref 27–34)
MCHC RBC-ENTMCNC: 33 GM/DL — SIGNIFICANT CHANGE UP (ref 32–36)
MCV RBC AUTO: 85.1 FL — SIGNIFICANT CHANGE UP (ref 80–100)
NRBC # BLD: 0 /100 WBCS — SIGNIFICANT CHANGE UP (ref 0–0)
PLATELET # BLD AUTO: 109 K/UL — LOW (ref 150–400)
POTASSIUM SERPL-MCNC: 4 MMOL/L — SIGNIFICANT CHANGE UP (ref 3.5–5.3)
POTASSIUM SERPL-SCNC: 4 MMOL/L — SIGNIFICANT CHANGE UP (ref 3.5–5.3)
RBC # BLD: 3.35 M/UL — LOW (ref 3.8–5.2)
RBC # FLD: 17.1 % — HIGH (ref 10.3–14.5)
SODIUM SERPL-SCNC: 141 MMOL/L — SIGNIFICANT CHANGE UP (ref 135–145)
WBC # BLD: 6.66 K/UL — SIGNIFICANT CHANGE UP (ref 3.8–10.5)
WBC # FLD AUTO: 6.66 K/UL — SIGNIFICANT CHANGE UP (ref 3.8–10.5)

## 2018-08-25 PROCEDURE — 99233 SBSQ HOSP IP/OBS HIGH 50: CPT

## 2018-08-25 RX ORDER — TRAMADOL HYDROCHLORIDE 50 MG/1
1 TABLET ORAL
Qty: 20 | Refills: 0 | OUTPATIENT
Start: 2018-08-25 | End: 2018-08-29

## 2018-08-25 RX ORDER — DOCUSATE SODIUM 100 MG
1 CAPSULE ORAL
Qty: 60 | Refills: 0 | OUTPATIENT
Start: 2018-08-25 | End: 2018-09-23

## 2018-08-25 RX ORDER — ACETAMINOPHEN 500 MG
2 TABLET ORAL
Qty: 0 | Refills: 0 | COMMUNITY
Start: 2018-08-25

## 2018-08-25 RX ORDER — ENOXAPARIN SODIUM 100 MG/ML
40 INJECTION SUBCUTANEOUS
Qty: 14 | Refills: 0 | OUTPATIENT
Start: 2018-08-25 | End: 2018-09-07

## 2018-08-25 RX ADMIN — PANTOPRAZOLE SODIUM 40 MILLIGRAM(S): 20 TABLET, DELAYED RELEASE ORAL at 06:30

## 2018-08-25 RX ADMIN — SODIUM CHLORIDE 3 MILLILITER(S): 9 INJECTION INTRAMUSCULAR; INTRAVENOUS; SUBCUTANEOUS at 05:11

## 2018-08-25 RX ADMIN — ENOXAPARIN SODIUM 40 MILLIGRAM(S): 100 INJECTION SUBCUTANEOUS at 06:30

## 2018-08-25 RX ADMIN — OXYCODONE HYDROCHLORIDE 10 MILLIGRAM(S): 5 TABLET ORAL at 10:00

## 2018-08-25 RX ADMIN — Medication 1 MILLIGRAM(S): at 06:29

## 2018-08-25 NOTE — PROGRESS NOTE ADULT - SUBJECTIVE AND OBJECTIVE BOX
HPI:  72 y.o. female with PMH anxiety, GERD presents with s/p fall. Pt reports sitting in porch reading when she fell asleep. When she woke up, her left leg was numb and was "asleep". She got up and fell on the left side. Pt reports head trauma. Denies LOC. Reports pain to left hip. She denies any lightheadedness or dizziness. Denies feeling weak. Pt denies any fever, chills, CP, SOB, abd pain, dysuria or diarrhea. Pt baseline ambulatory, able to do all ADLs, able to climb 2 flights of stairs without any issues.    PMH: as above  PSH: L knee arthroscopy, cholecystectomy, b/l cataracts, tonsillectomy  Social Hx: +tobacco use ~1PPD for 50 yrs, occ EtOH  Family Hx: Mother-rheumatic fever with heart disease; Father-bone cancer  ROS: per HPI (22 Aug 2018 01:49)      Patient is a 72y old  Female who presents with a chief complaint of s/p fall (22 Aug 2018 22:07)      Consulted by Dr. Pickard for VTE prophylaxis, risk stratification, and anticoagulation management.    PAST MEDICAL & SURGICAL HISTORY:  Anxiety    8/24:  weepy, upset about being transfused, discussed importance of and is agreeable,  aware she will need to go home on lovenox, unable to take ASA due toallergy  8/25/18: Pt seen at bedside, OOB to chair, daughter at side. Pt being discharge to home today. Informed her lovenox sent to pharmacy and copay $5. She is stable after s/p 2 units pRBC transfusion yesterday.     CrCl:31.32    Caprini VTE Risk Score: 7    IMPROVE Bleeding Risk Score 1.5    Falls Risk:   High (x  )  Mod (  )  Low (  )    FAMILY HISTORY:    Denies any personal or familial history of clotting or bleeding disorders.    Allergies:  aspirin (Swelling)  Bactrim (Unknown)  erythromycin (Hives)  NSAIDs (Swelling)  penicillins (Hives)  Zithromax (Hives)    Intolerances    REVIEW OF SYSTEMS    (  )Fever	     (  )Constipation	(  )SOB				(  )Headache	(  )Dysuria  (  )Chills	     (  )Melena	(  )Dyspnea present on exertion	                    (  )Dizziness                    (  )Polyuria  (  )Nausea	     (  )Hematochezia	(  )Cough			                    (  )Syncope   	(  )Hematuria  (  )Vomiting    (  )Chest Pain	(  )Wheezing			(  )Weakness  (  )Diarrhea     (  )Palpitations	(  )Anorexia			(  )Myalgia    All other review of systems negative: Yes    PHYSICAL EXAM:    Constitutional: Appears well    Neurological: A& O x 4    Skin: Warm, dry    Respiratory and Thorax: normal effort; Breath sounds: normal; No rales/wheezing/rhonchi  	  Cardiovascular: S1, S2, regular, NMBR	    Gastrointestinal: BS + x 4Q, nontender	    Genitourinary:  Bladder nondistended, nontender    Musculoskeletal:   General Right:   no muscle/joint tenderness,   normal tone, no joint swelling,   ROM: full	    General Left:   no muscle/joint tenderness,   normal tone, no joint swelling,   ROM: limited    Hip: Left: Dressing CDI     Lower extrems:   Right: no calf tenderness              negative yoandy's sign               + pedal pulses    Left:   no calf tenderness              negative yoandy's sign               + pedal pulses                          9.4    6.66  )-----------( 109      ( 25 Aug 2018 05:20 )             28.5       08-25    141  |  104  |  15  ----------------------------<  92  4.0   |  28  |  0.61    Ca    8.4<L>      25 Aug 2018 05:20                          7.3    6.48  )-----------( 118      ( 24 Aug 2018 04:59 )             21.4       08-24    140  |  105  |  17  ----------------------------<  96  3.8   |  28  |  0.70    Ca    8.0<L>      24 Aug 2018 04:59                          9.0    7.38  )-----------( 114      ( 23 Aug 2018 06:25 )             27.2       08-23    139  |  106  |  13  ----------------------------<  114<H>  4.1   |  26  |  0.73    Ca    8.2<L>      23 Aug 2018 06:25    TPro  7.3  /  Alb  3.9  /  TBili  0.2  /  DBili  x   /  AST  15  /  ALT  20  /  AlkPhos  77  08-21    PT/INR - ( 22 Aug 2018 03:54 )   PT: 10.9 sec;   INR: 1.01 ratio    PTT - ( 22 Aug 2018 03:54 )  PTT:29.7 sec		      CT of HEad:    IMPRESSION:    No acute intracranial hemorrhage, mass effect, or CT evidence of a large   vascular territory infarct.If clinically indicated, short-term follow-up   or MRI may be obtained for further evaluation provided no MR   contraindications.  		  Vital Signs Last 24 Hrs  T(C): 36.8 (08-25-18 @ 11:37), Max: 37.3 (08-24-18 @ 17:05)  T(F): 98.3 (08-25-18 @ 11:37), Max: 99.2 (08-24-18 @ 17:05)  HR: 94 (08-25-18 @ 11:37) (92 - 101)  BP: 111/54 (08-25-18 @ 11:37) (109/57 - 120/59)  BP(mean): --  RR: 14 (08-25-18 @ 11:37) (14 - 16)  SpO2: 96% (08-25-18 @ 11:37) (95% - 100%)    MEDICATIONS  (STANDING):  clonazePAM Tablet 0.5 milliGRAM(s) Oral <User Schedule>  clonazePAM Tablet 1 milliGRAM(s) Oral two times a day  docusate sodium 100 milliGRAM(s) Oral three times a day  enoxaparin Injectable 40 milliGRAM(s) SubCutaneous every 24 hours  lactated ringers. 1000 milliLiter(s) (75 mL/Hr) IV Continuous <Continuous>  lactated ringers. 1000 milliLiter(s) (75 mL/Hr) IV Continuous <Continuous>  multivitamin 1 Tablet(s) Oral daily  pantoprazole    Tablet 40 milliGRAM(s) Oral before breakfast  sodium chloride 0.9% lock flush 3 milliLiter(s) IV Push every 8 hours    Heparin SQ           (  )  Coumadin             (  )  Xarelto                  (  )  Eliquis                   (  )  Venodynes           (  )  Ambulation          (x  )  UFH                     (  )  Contraindicated    (  )  LMWH                 ( x )  DVT Prophylaxis    (x  )

## 2018-08-25 NOTE — PROGRESS NOTE ADULT - PROVIDER SPECIALTY LIST ADULT
Anticoag Management
Hospitalist
Orthopedics
Anticoag Management

## 2018-08-25 NOTE — PROGRESS NOTE ADULT - SUBJECTIVE AND OBJECTIVE BOX
c/c: fall    HPI:  72 y.o. female with PMH anxiety, GERD presents with s/p fall. Pt reports sitting in porch reading when she fell asleep. When she woke up, her left leg was numb and was "asleep". She got up and fell on the left side. Pt reports head trauma. Denies LOC. Reports pain to left hip. She denies any lightheadedness or dizziness. Denies feeling weak. Pt denies any fever, chills, CP, SOB, abd pain, dysuria or diarrhea. Pt baseline ambulatory, able to do all ADLs, able to climb 2 flights of stairs without any issues.She weas admitted with left hip fracture. Underwent IM nail 8/22.    8/25: Pt seen and examined. Chart reviewed. No new complaints. Ambulating from bathroom to chair with walker. Good appetite. +BM. Pain controlled with medications Hgb improved post transfusion. No other complaints. No chest pain or SOB. d/c home today with home PT.     Review of system- All 10 systems reviewed and is as per HPI otherwise negative.     Vital Signs Last 24 Hrs  T(C): 36.8 (25 Aug 2018 11:37), Max: 37.3 (24 Aug 2018 17:05)  T(F): 98.3 (25 Aug 2018 11:37), Max: 99.2 (24 Aug 2018 17:05)  HR: 94 (25 Aug 2018 11:37) (92 - 101)  BP: 111/54 (25 Aug 2018 11:37) (105/49 - 120/59)  BP(mean): --  RR: 14 (25 Aug 2018 11:37) (14 - 16)  SpO2: 96% (25 Aug 2018 11:37) (95% - 100%)    PHYSICAL EXAM:    GENERAL: Comfortable, no acute distress  HEAD:  Atraumatic, Normocephalic  EYES: EOMI, PERRLA  HEENT: Moist mucous membranes  NECK: Supple, No JVD  NERVOUS SYSTEM:  Alert & Oriented X3, non focal.   CHEST/LUNG: Clear to auscultation bilaterally  HEART: Regular rate and rhythm; No murmurs, rubs, or gallops  ABDOMEN: Soft, Nontender, Nondistended; Bowel sounds present  GENITOURINARY- Voiding, no palpable bladder  EXTREMITIES:  No clubbing, cyanosis, or edema  MUSCULOSKELETAL- LLE dressing intact.  SKIN-no rash  LABS:             Labs                              9.4    6.66  )-----------( 109      ( 25 Aug 2018 05:20 )             28.5     25 Aug 2018 05:20    141    |  104    |  15     ----------------------------<  92     4.0     |  28     |  0.61     Ca    8.4        25 Aug 2018 05:20        CAPILLARY BLOOD GLUCOSE        MEDS  acetaminophen   Tablet 650 milliGRAM(s) Oral every 6 hours PRN  acetaminophen   Tablet. 650 milliGRAM(s) Oral every 6 hours PRN  benzocaine 15 mG/menthol 3.6 mG Lozenge 1 Lozenge Oral every 3 hours PRN  clonazePAM Tablet 0.5 milliGRAM(s) Oral <User Schedule>  clonazePAM Tablet 1 milliGRAM(s) Oral two times a day  diphenhydrAMINE   Capsule 25 milliGRAM(s) Oral every 6 hours PRN  diphenhydrAMINE   Capsule 25 milliGRAM(s) Oral at bedtime PRN  docusate sodium 100 milliGRAM(s) Oral three times a day  enoxaparin Injectable 40 milliGRAM(s) SubCutaneous every 24 hours  HYDROmorphone  Injectable 0.5 milliGRAM(s) SubCutaneous every 3 hours PRN  lactated ringers. 1000 milliLiter(s) IV Continuous <Continuous>  lactated ringers. 1000 milliLiter(s) IV Continuous <Continuous>  loratadine 10 milliGRAM(s) Oral daily PRN  morphine  - Injectable 2 milliGRAM(s) IV Push every 4 hours PRN  multivitamin 1 Tablet(s) Oral daily  ondansetron Injectable 4 milliGRAM(s) IV Push every 6 hours PRN  ondansetron Injectable 4 milliGRAM(s) IV Push every 6 hours PRN  oxyCODONE    IR 10 milliGRAM(s) Oral every 4 hours PRN  oxyCODONE    IR 5 milliGRAM(s) Oral every 4 hours PRN  pantoprazole    Tablet 40 milliGRAM(s) Oral before breakfast  sodium chloride 0.9% lock flush 3 milliLiter(s) IV Push every 8 hours    ASSESSMENT AND PLAN:  72F, PMH as above a/w:    1. Mechanical fall/left hip fracture:  -s/p IM nail pod#3  -pain control  -physical therapy  -incentive spirometry.  - d/c home today    2. Acute blood loss anemia:  -due to surgery  -s/p 2 units PRBC, hgb improved    3. Anxiety  -cont klonipin 1mg AM, 0.5mg mid-day, 1mg PM    4. Tobacco use d/o  -encouraged smoking cessation  -pt currently refused nicotine patch    5.GERD  -on PPI    6. Dispo: d/c home today with home PT

## 2018-08-25 NOTE — PROGRESS NOTE ADULT - ASSESSMENT
A: 71 yo female, s/p fall from chair closed fracture of  Left Hip. S/P left ORIF 8/22. high thrombosis risk due to age, surgery and immobility    Discharge Plan:  c/w Lovenox 40 mg daily x 4 weeks (8/23/18 - 9/20/18) , [facility] to [facility] for 14 syringes with 1 refill, copay $5    Will continue to follow as outpatient, please call [phone] for this weekend

## 2018-08-25 NOTE — PROGRESS NOTE ADULT - SUBJECTIVE AND OBJECTIVE BOX
Pt S/E at bedside, no acute events overnight, pain controlled. 2u prbc tolerated well.      Vital Signs Last 24 Hrs  T(C): 37.1 (08-24-18 @ 23:35), Max: 37.3 (08-24-18 @ 17:05)  T(F): 98.7 (08-24-18 @ 23:35), Max: 99.2 (08-24-18 @ 17:05)  HR: 92 (08-24-18 @ 23:35) (92 - 112)  BP: 109/57 (08-24-18 @ 23:35) (97/56 - 120/59)  BP(mean): --  RR: 16 (08-24-18 @ 23:35) (16 - 16)  SpO2: 95% (08-24-18 @ 23:35) (95% - 100%)                          9.4    6.66  )-----------( 109      ( 25 Aug 2018 05:20 )             28.5     08-25    141  |  104  |  15  ----------------------------<  92  4.0   |  28  |  0.61    Ca    8.4<L>      25 Aug 2018 05:20        Left Lower Extremity:  Dressing clean dry intact  Aquacell removed. Staples intact. No drainage.  New aquacell placed.   +EHL/FHL/TA/GS  SILT L3-S1  +DP/PT Pulses  Compartments soft  No calf TTP B/L
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
## 2018-08-29 DIAGNOSIS — K21.9 GASTRO-ESOPHAGEAL REFLUX DISEASE WITHOUT ESOPHAGITIS: ICD-10-CM

## 2018-08-29 DIAGNOSIS — Z88.8 ALLERGY STATUS TO OTHER DRUGS, MEDICAMENTS AND BIOLOGICAL SUBSTANCES: ICD-10-CM

## 2018-08-29 DIAGNOSIS — S72.142A DISPLACED INTERTROCHANTERIC FRACTURE OF LEFT FEMUR, INITIAL ENCOUNTER FOR CLOSED FRACTURE: ICD-10-CM
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
## 2018-08-29 DIAGNOSIS — Z79.899 OTHER LONG TERM (CURRENT) DRUG THERAPY: ICD-10-CM

## 2018-08-29 DIAGNOSIS — D62 ACUTE POSTHEMORRHAGIC ANEMIA: ICD-10-CM

## 2018-08-29 DIAGNOSIS — F17.210 NICOTINE DEPENDENCE, CIGARETTES, UNCOMPLICATED: ICD-10-CM

## 2018-08-29 DIAGNOSIS — Z88.0 ALLERGY STATUS TO PENICILLIN: ICD-10-CM

## 2018-08-29 DIAGNOSIS — F41.9 ANXIETY DISORDER, UNSPECIFIED: ICD-10-CM

## 2020-11-18 NOTE — ED PROCEDURE NOTE - CPROC ED POST PROC CARE GUIDE1
Does she needs a refill?  She takes capsules I believe
Need clarification for venlafaxine - tablets or capsules
Please advise
Elevate the injured extremity as instructed./Instructed patient/caregiver to follow-up with primary care physician./Verbal/written post procedure instructions were given to patient/caregiver./Instructed patient/caregiver regarding signs and symptoms of infection./Keep the cast/splint/dressing clean and dry.

## 2023-06-02 NOTE — ED PROVIDER NOTE - ENMT, MLM
"Patient states feeling \"much better\" today  Feels the heat was a major factor yesterday.  Today, denies dizziness  Able to take in adequate food & fluid  Recommended ED/UC with any acute/rapid decline in condition.  Call back to the clinic with any further questions/concerns  Patient relayed understanding  No further concerns at calls end.   " Airway patent, Nasal mucosa clear. Mouth with normal mucosa. Throat has no vesicles, no oropharyngeal exudates and uvula is midline.

## 2023-06-21 NOTE — ED ADULT TRIAGE NOTE - NS ED NOTE AC HIGH RISK COUNTRIES
Anesthesia Volume In Cc: 3 Removed With: scissors Total Number Of Lesions Treated: 6 Price (Use Numbers Only, No Special Characters Or $): 60 Consent: Written consent obtained and the risks of skin tag removal was reviewed with the patient including but not limited to bleeding, pigmentary change, infection, pain, and remote possibility of scarring. Hemostasis: Aluminum Chloride Anesthesia Type: 1% lidocaine with epinephrine Detail Level: Detailed No

## 2023-07-17 NOTE — ED ADULT NURSE NOTE - NS ED NOTE  TALK SOMEONE YN
From: Bulmaro Franklin  To: Catherine Burt  Sent: 7/17/2023 1:32 PM CDT  Subject: Colonoscopy prep     Hello, my [facility] automatically cancelled my sutab order for my colonoscopy procedure on 7/24. I don't know why and to be honest I'm almita tired of them either not being helpful or making me contact my doctor anyways     Can you send a order over to [facility] in [place] again so I have prep ready thanks!    Cesar   No

## 2023-12-20 NOTE — DISCHARGE NOTE ADULT - NS AS DC FU INST LIST INST
[FreeTextEntry1] : Procedure Note: Denosumab 60 mg SC administered into left upper arm. Lucía Mcbride MD  Recent laboratory results as below; discussed with Ms. Rendon's granddaughter per her preference. TSH, T4/T3 within range on methimazole 2.5 mg daily and recommended she continue. eGFR around baseline. Vitamin D within range. 12/20/23 
no

## 2024-11-07 NOTE — PROGRESS NOTE ADULT - ASSESSMENT
A/P: 72F w/ L IT Fx s/p IMN POD3    Pain Control as needed  DVT ppx  WBAT  PT  Incentive Spirometry  Medical management appreciated  DC planning Home today Detail Level: Zone

## 2025-04-02 ENCOUNTER — APPOINTMENT (OUTPATIENT)
Dept: ORTHOPEDIC SURGERY | Facility: CLINIC | Age: 80
End: 2025-04-02

## 2025-05-02 PROBLEM — Z00.00 ENCOUNTER FOR PREVENTIVE HEALTH EXAMINATION: Status: ACTIVE | Noted: 2025-05-02

## 2025-05-14 ENCOUNTER — APPOINTMENT (OUTPATIENT)
Dept: ORTHOPEDIC SURGERY | Facility: CLINIC | Age: 80
End: 2025-05-14
Payer: MEDICARE

## 2025-05-14 VITALS — BODY MASS INDEX: 18.78 KG/M2 | HEIGHT: 64 IN | WEIGHT: 110 LBS

## 2025-05-14 DIAGNOSIS — M54.16 RADICULOPATHY, LUMBAR REGION: ICD-10-CM

## 2025-05-14 DIAGNOSIS — M48.07 SPINAL STENOSIS, LUMBOSACRAL REGION: ICD-10-CM

## 2025-05-14 DIAGNOSIS — F17.210 NICOTINE DEPENDENCE, CIGARETTES, UNCOMPLICATED: ICD-10-CM

## 2025-05-14 PROCEDURE — 99204 OFFICE O/P NEW MOD 45 MIN: CPT

## 2025-07-20 NOTE — ED ADULT TRIAGE NOTE - NS ED NURSE BANDS TYPE
Patient normally on aspirin and atorvastatin.  Can resume aspirin if hemoglobin remained stable   Name band;